# Patient Record
Sex: FEMALE | Race: ASIAN | NOT HISPANIC OR LATINO | Employment: UNEMPLOYED | URBAN - METROPOLITAN AREA
[De-identification: names, ages, dates, MRNs, and addresses within clinical notes are randomized per-mention and may not be internally consistent; named-entity substitution may affect disease eponyms.]

---

## 2022-04-25 ENCOUNTER — OFFICE VISIT (OUTPATIENT)
Dept: PRIMARY CARE | Facility: CLINIC | Age: 42
End: 2022-04-25
Payer: COMMERCIAL

## 2022-04-25 VITALS
WEIGHT: 122 LBS | TEMPERATURE: 97.5 F | HEIGHT: 64 IN | OXYGEN SATURATION: 97 % | HEART RATE: 83 BPM | SYSTOLIC BLOOD PRESSURE: 112 MMHG | DIASTOLIC BLOOD PRESSURE: 70 MMHG | BODY MASS INDEX: 20.83 KG/M2

## 2022-04-25 DIAGNOSIS — Z00.00 ENCOUNTER FOR PREVENTATIVE ADULT HEALTH CARE EXAMINATION: ICD-10-CM

## 2022-04-25 DIAGNOSIS — E78.00 HYPERCHOLESTEROLEMIA: ICD-10-CM

## 2022-04-25 DIAGNOSIS — G44.89 OTHER HEADACHE SYNDROME: ICD-10-CM

## 2022-04-25 DIAGNOSIS — B00.1 COLD SORE: ICD-10-CM

## 2022-04-25 DIAGNOSIS — Z00.00 PREVENTATIVE HEALTH CARE: Primary | ICD-10-CM

## 2022-04-25 DIAGNOSIS — R92.1 BREAST CALCIFICATIONS: ICD-10-CM

## 2022-04-25 DIAGNOSIS — Z12.31 ENCOUNTER FOR SCREENING MAMMOGRAM FOR MALIGNANT NEOPLASM OF BREAST: ICD-10-CM

## 2022-04-25 PROCEDURE — 99386 PREV VISIT NEW AGE 40-64: CPT | Performed by: INTERNAL MEDICINE

## 2022-04-25 PROCEDURE — 3008F BODY MASS INDEX DOCD: CPT | Performed by: INTERNAL MEDICINE

## 2022-04-25 RX ORDER — VALACYCLOVIR HYDROCHLORIDE 1 G/1
TABLET, FILM COATED ORAL
COMMUNITY
Start: 2022-04-20 | End: 2023-02-14 | Stop reason: SDUPTHER

## 2022-04-25 ASSESSMENT — ENCOUNTER SYMPTOMS
BACK PAIN: 0
BLOOD IN STOOL: 0
LIGHT-HEADEDNESS: 0
HEMATURIA: 0
COUGH: 0
FATIGUE: 0
FEVER: 0
WHEEZING: 0
ARTHRALGIAS: 0
SLEEP DISTURBANCE: 0
MYALGIAS: 0
DYSPHORIC MOOD: 0
APPETITE CHANGE: 0
SINUS PRESSURE: 0
HEADACHES: 1
ACTIVITY CHANGE: 0
DIZZINESS: 0
PALPITATIONS: 0
NERVOUS/ANXIOUS: 0

## 2022-04-25 ASSESSMENT — PATIENT HEALTH QUESTIONNAIRE - PHQ9: SUM OF ALL RESPONSES TO PHQ9 QUESTIONS 1 & 2: 0

## 2022-04-25 NOTE — ASSESSMENT & PLAN NOTE
Noted on mammogr in [place].  Had biopsy in July 2021.  Plan mammogram -- ordered.  Advised pt to request mammogram records from [facility] for radiologist here to be able to compare.

## 2022-04-25 NOTE — PROGRESS NOTES
[facility] [place]  Dr. Vibha Feliciano  [address], [address]  [place] [address]  Phone: [phone]  Fax: [phone]        Patient ID: Tyree Rob                              : 1980    Visit Date: 2022    Chief Complaint: Establish Care      Patient ID: Tyree Rob is a 42 y.o. female.    HPI  New to area--    Moved from [place] -  from this area.    Hx high chol.  Trying to manage with diet.    Screening:  Exercise: 2-3 days/week     Diet: trying to eat healthy     Mood: No concerns     Occupation: home     Living Situation: lives with family     Dental: no concerns     Eye Check: No concern     Smoking: none    Alcohol: rare    Mammogram due in July.  Cervical Cancer Screening due -- will set up with our office    Vaccinations:  HIV Screening due on   Influenza Vaccine(1) due   DTaP, Tdap, and Td Vaccines(2 - Td) UTD      Hx breast calcifications-- had biopsy last year.  Was negative.        Patient Active Problem List   Diagnosis   • Breast calcifications   • Hypercholesterolemia   • Other headache syndrome   • Cold sore   • Encounter for preventative adult health care examination       Past Medical History:   Diagnosis Date   • H/O cold sores        Past Surgical History:   Procedure Laterality Date   •  SECTION      x2   • CORRECTION HAMMER TOE Right    • GANGLION CYST EXCISION Left          Current Outpatient Medications:   •  valACYclovir (VALTREX) 1 gram tablet, TAKE 2 TABLETS BY MOUTH EVERY 12 HOURS FOR 1 DAY, Disp: , Rfl:     No Known Allergies    Family History   Problem Relation Age of Onset   • Hyperlipidemia Biological Mother    • Osteoporosis Biological Mother    • Hyperlipidemia Biological Father    • Hypertension Biological Father        Social History     Tobacco Use   • Smoking status: Never Smoker   • Smokeless tobacco: Never Used   Substance Use Topics   • Alcohol use: Never   • Drug use: Never       Health  "Maintenance   Topic Date Due   • Cervical Cancer Screening  04/25/2022 (Originally 1/22/1999)   • HIV Screening  04/25/2023 (Originally 1/22/1991)   • Hepatitis C Screening  04/25/2023 (Originally 1/22/1996)   • Influenza Vaccine (Season Ended) 08/01/2022   • Breast Cancer Screening  07/01/2023   • DTaP, Tdap, and Td Vaccines (2 - Td or Tdap) 04/15/2024   • Zoster Vaccine (1 of 2) 01/22/2028   • COVID-19 Vaccine  Completed   • Meningococcal ACWY  Aged Out   • HIB Vaccines  Aged Out   • IPV Vaccines  Aged Out   • HPV Vaccines  Aged Out   • Pneumococcal  Aged Out         The following have been reviewed and updated as appropriate in this visit:   Tobacco  Allergies  Meds  Problems  Med Hx  Surg Hx  Fam Hx           Review of System  Review of Systems   Constitutional: Negative for activity change, appetite change, fatigue and fever.   HENT: Negative for dental problem, hearing loss, sinus pressure and sneezing.    Eyes: Negative for visual disturbance.   Respiratory: Negative for cough and wheezing.    Cardiovascular: Negative for chest pain, palpitations and leg swelling.   Gastrointestinal: Negative for blood in stool.   Genitourinary: Negative for hematuria.   Musculoskeletal: Negative for arthralgias, back pain and myalgias.   Neurological: Positive for headaches. Negative for dizziness and light-headedness.   Psychiatric/Behavioral: Negative for dysphoric mood and sleep disturbance. The patient is not nervous/anxious.        Objective     Vitals  Vitals:    04/25/22 1525   BP: 112/70   BP Location: Left upper arm   Patient Position: Sitting   Pulse: 83   Temp: 36.4 °C (97.5 °F)   TempSrc: Temporal   SpO2: 97%   Weight: 55.3 kg (122 lb)   Height: 1.626 m (5' 4\")       Body mass index is 20.94 kg/m².    Physical Exam  Physical Exam  Constitutional:       General: She is not in acute distress.     Appearance: Normal appearance. She is well-developed. She is not diaphoretic.   HENT:      Head: Normocephalic " and atraumatic.      Right Ear: Tympanic membrane, ear canal and external ear normal. There is no impacted cerumen.      Left Ear: Tympanic membrane, ear canal and external ear normal. There is no impacted cerumen.      Nose: Nose normal. No congestion.      Mouth/Throat:      Mouth: Mucous membranes are moist.      Pharynx: Oropharynx is clear. No oropharyngeal exudate.   Eyes:      General: No scleral icterus.        Right eye: No discharge.         Left eye: No discharge.      Conjunctiva/sclera: Conjunctivae normal.      Pupils: Pupils are equal, round, and reactive to light.   Neck:      Thyroid: No thyromegaly.      Vascular: No JVD.      Trachea: No tracheal deviation.   Cardiovascular:      Rate and Rhythm: Normal rate and regular rhythm.      Heart sounds: Normal heart sounds. No murmur heard.    No friction rub. No gallop.   Pulmonary:      Effort: Pulmonary effort is normal. No respiratory distress.      Breath sounds: Normal breath sounds. No wheezing or rales.   Chest:      Chest wall: No tenderness.   Abdominal:      General: There is no distension.      Palpations: Abdomen is soft. There is no mass.      Tenderness: There is no abdominal tenderness. There is no guarding or rebound.      Hernia: No hernia is present.   Musculoskeletal:         General: No tenderness or deformity. Normal range of motion.      Cervical back: Normal range of motion and neck supple.      Right lower leg: No edema.      Left lower leg: No edema.   Lymphadenopathy:      Cervical: No cervical adenopathy.   Skin:     General: Skin is warm and dry.      Coloration: Skin is not pale.      Findings: No erythema or rash.   Neurological:      Mental Status: She is alert and oriented to person, place, and time.      Cranial Nerves: No cranial nerve deficit.      Sensory: No sensory deficit.      Motor: No abnormal muscle tone.      Coordination: Coordination normal.      Deep Tendon Reflexes: Reflexes normal.   Psychiatric:          Behavior: Behavior normal.         Thought Content: Thought content normal.         Judgment: Judgment normal.         Assessment/Plan     Problem List Items Addressed This Visit        Unprioritized    Breast calcifications     Noted on mammogr in [place].  Had biopsy in July 2021.  Plan mammogram -- ordered.  Advised pt to request mammogram records from [facility] for radiologist here to be able to compare.            Cold sore     On PRN Valtrex.           Relevant Medications    valACYclovir (VALTREX) 1 gram tablet    Encounter for preventative adult health care examination     Reviewed vaccinations, diet, exercise, work setting; patient Medical, Surgical, FH, SH, allergies.   FH - both parents with DM2, high chol.   Ordered pertinent labs.  Discussed continue to exercise regularly and practice healthy dietary choices.              Hypercholesterolemia     Borderline.  Diet controlled.           Other headache syndrome     Pre menstural.  Stable.              Other Visit Diagnoses     Preventative health care    -  Primary    Relevant Orders    Comprehensive metabolic panel    CBC and differential    Lipid panel    Urinalysis with Reflex Culture    TSH w reflex FT4    Encounter for screening mammogram for malignant neoplasm of breast        Relevant Orders    BI SCREENING MAMMOGRAM BILATERAL(TOMOSYNTHESIS)          Return in about 1 year (around 4/25/2023).      Vibha Feliciano MD  4/25/2022

## 2022-04-25 NOTE — ASSESSMENT & PLAN NOTE
Reviewed vaccinations, diet, exercise, work setting; patient Medical, Surgical, FH, SH, allergies.   FH - both parents with DM2, high chol.   Ordered pertinent labs.  Discussed continue to exercise regularly and practice healthy dietary choices.

## 2022-04-28 LAB
ALBUMIN SERPL-MCNC: 4.6 G/DL (ref 3.6–5.1)
ALBUMIN/GLOB SERPL: 1.8 (CALC) (ref 1–2.5)
ALP SERPL-CCNC: 40 U/L (ref 31–125)
ALT SERPL-CCNC: 8 U/L (ref 6–29)
APPEARANCE UR: CLEAR
AST SERPL-CCNC: 15 U/L (ref 10–30)
BACTERIA #/AREA URNS HPF: ABNORMAL /HPF
BACTERIA UR CULT: NORMAL
BASOPHILS # BLD AUTO: 70 CELLS/UL (ref 0–200)
BASOPHILS NFR BLD AUTO: 1.8 %
BILIRUB SERPL-MCNC: 0.5 MG/DL (ref 0.2–1.2)
BILIRUB UR QL STRIP: NEGATIVE
BUN SERPL-MCNC: 15 MG/DL (ref 7–25)
BUN/CREAT SERPL: NORMAL (CALC) (ref 6–22)
CALCIUM SERPL-MCNC: 9.2 MG/DL (ref 8.6–10.2)
CHLORIDE SERPL-SCNC: 108 MMOL/L (ref 98–110)
CHOLEST SERPL-MCNC: 185 MG/DL
CHOLEST/HDLC SERPL: 3.4 (CALC)
CO2 SERPL-SCNC: 26 MMOL/L (ref 20–32)
COLOR UR: YELLOW
CREAT SERPL-MCNC: 0.7 MG/DL (ref 0.5–1.1)
EOSINOPHIL # BLD AUTO: 308 CELLS/UL (ref 15–500)
EOSINOPHIL NFR BLD AUTO: 7.9 %
ERYTHROCYTE [DISTWIDTH] IN BLOOD BY AUTOMATED COUNT: 12.7 % (ref 11–15)
GLOBULIN SER CALC-MCNC: 2.6 G/DL (CALC) (ref 1.9–3.7)
GLUCOSE SERPL-MCNC: 86 MG/DL (ref 65–99)
GLUCOSE UR QL STRIP: NEGATIVE
HCT VFR BLD AUTO: 37.1 % (ref 35–45)
HDLC SERPL-MCNC: 54 MG/DL
HGB BLD-MCNC: 12.5 G/DL (ref 11.7–15.5)
HGB UR QL STRIP: ABNORMAL
HYALINE CASTS #/AREA URNS LPF: ABNORMAL /LPF
KETONES UR QL STRIP: NEGATIVE
LDLC SERPL CALC-MCNC: 116 MG/DL (CALC)
LEUKOCYTE ESTERASE UR QL STRIP: NEGATIVE
LYMPHOCYTES # BLD AUTO: 1162 CELLS/UL (ref 850–3900)
LYMPHOCYTES NFR BLD AUTO: 29.8 %
MCH RBC QN AUTO: 31.5 PG (ref 27–33)
MCHC RBC AUTO-ENTMCNC: 33.7 G/DL (ref 32–36)
MCV RBC AUTO: 93.5 FL (ref 80–100)
MONOCYTES # BLD AUTO: 296 CELLS/UL (ref 200–950)
MONOCYTES NFR BLD AUTO: 7.6 %
NEUTROPHILS # BLD AUTO: 2063 CELLS/UL (ref 1500–7800)
NEUTROPHILS NFR BLD AUTO: 52.9 %
NITRITE UR QL STRIP: NEGATIVE
NONHDLC SERPL-MCNC: 131 MG/DL (CALC)
PH UR STRIP: 7 [PH] (ref 5–8)
PLATELET # BLD AUTO: 119 THOUSAND/UL (ref 140–400)
PMV BLD REES-ECKER: 12.6 FL (ref 7.5–12.5)
POTASSIUM SERPL-SCNC: 4.1 MMOL/L (ref 3.5–5.3)
PROT SERPL-MCNC: 7.2 G/DL (ref 6.1–8.1)
PROT UR QL STRIP: NEGATIVE
QUEST EGFR AFRICAN AMERICAN: 122 ML/MIN/1.73M2
QUEST EGFR NON-AFR. AMERICAN: 105 ML/MIN/1.73M2
RBC # BLD AUTO: 3.97 MILLION/UL (ref 3.8–5.1)
RBC #/AREA URNS HPF: ABNORMAL /HPF
SODIUM SERPL-SCNC: 141 MMOL/L (ref 135–146)
SP GR UR STRIP: 1.02 (ref 1–1.03)
SQUAMOUS #/AREA URNS HPF: ABNORMAL /HPF
TRIGL SERPL-MCNC: 60 MG/DL
TSH SERPL-ACNC: 1.98 MIU/L
WBC # BLD AUTO: 3.9 THOUSAND/UL (ref 3.8–10.8)
WBC #/AREA URNS HPF: ABNORMAL /HPF

## 2022-04-29 DIAGNOSIS — D69.6 THROMBOCYTOPENIA (CMS/HCC): Primary | ICD-10-CM

## 2022-05-12 ENCOUNTER — OFFICE VISIT (OUTPATIENT)
Dept: PRIMARY CARE | Facility: CLINIC | Age: 42
End: 2022-05-12
Payer: COMMERCIAL

## 2022-05-12 VITALS
RESPIRATION RATE: 18 BRPM | TEMPERATURE: 98.4 F | DIASTOLIC BLOOD PRESSURE: 68 MMHG | SYSTOLIC BLOOD PRESSURE: 98 MMHG | BODY MASS INDEX: 20.96 KG/M2 | OXYGEN SATURATION: 99 % | HEIGHT: 64 IN | WEIGHT: 122.8 LBS | HEART RATE: 74 BPM

## 2022-05-12 DIAGNOSIS — Z01.419 ENCOUNTER FOR GYNECOLOGICAL EXAMINATION WITHOUT ABNORMAL FINDING: Primary | ICD-10-CM

## 2022-05-12 PROCEDURE — 99396 PREV VISIT EST AGE 40-64: CPT | Performed by: NURSE PRACTITIONER

## 2022-05-12 PROCEDURE — 3008F BODY MASS INDEX DOCD: CPT | Performed by: NURSE PRACTITIONER

## 2022-05-12 ASSESSMENT — PATIENT HEALTH QUESTIONNAIRE - PHQ9: SUM OF ALL RESPONSES TO PHQ9 QUESTIONS 1 & 2: 0

## 2022-05-12 ASSESSMENT — ENCOUNTER SYMPTOMS: CONSTITUTIONAL NEGATIVE: 1

## 2022-05-12 NOTE — PROGRESS NOTES
[facility] [place]  Jennifer TranovikipHEVER  [address]  [facility] [address]  [phone]  Fax [phone]      2022    Tyree Rob is a 42 y.o. female who presents for annual exam.   Periods are regular every 28-30 days, lasting 4 days. Dysmenorrhea:mild, occurring premenstrually. Cyclic symptoms include headache. No intermenstrual bleeding, spotting, or discharge.    The patient is sexually active. GYN screening history: last pap: was normal. Domestic violence: no.     Current contraception: none  History of abnormal Pap smear: no  Family history of uterine or ovarian cancer: no  Regular self breast exam: yes  History of abnormal mammogram: yes - calcification in left breast- did bx   Family history of breast cancer: no  History of abnormal lipids: no    Menstrual History:  OB History        3    Para   3    Term                AB        Living           SAB        IAB        Ectopic        Multiple        Live Births                    Patient's last menstrual period was 2022 (approximate).         Past Medical History:   Diagnosis Date   • H/O cold sores        Past Surgical History:   Procedure Laterality Date   •  SECTION      x2   • CORRECTION HAMMER TOE Right    • GANGLION CYST EXCISION Left        Family History   Problem Relation Age of Onset   • Hyperlipidemia Biological Mother    • Osteoporosis Biological Mother    • Hyperlipidemia Biological Father    • Hypertension Biological Father        Social History     Tobacco Use   • Smoking status: Never Smoker   • Smokeless tobacco: Never Used   Substance Use Topics   • Alcohol use: Never   • Drug use: Never       The following have been reviewed and updated as appropriate in this visit:  Allergies  Meds  Problems           HPI  Routine GYN  No hormones  Denies breast, pelvic or vaginal symptoms.      Review Of Systems  Review of Systems   Constitutional: Negative.   "  Genitourinary: Negative.    Breast: Negative.       Visit Vitals  BP 98/68 (BP Location: Left upper arm, Patient Position: Sitting)   Pulse 74   Temp 36.9 °C (98.4 °F) (Oral)   Resp 18   Ht 1.626 m (5' 4.02\")   Wt 55.7 kg (122 lb 12.8 oz)   LMP 04/28/2022 (Approximate)   SpO2 99%   BMI 21.07 kg/m²       OB/GYN Physical Exam  Physical Exam  Constitutional:       General: She is not in acute distress.     Appearance: Normal appearance. She is not diaphoretic.   Genitourinary:      Vagina, cervix, uterus and urethral meatus normal.     External female genitalia normal.   Urethral meatus normal.   Vagina normal.   Cervix exam normal.  Uterus is normal.   Adnexa normal. Neck:      Thyroid: No thyromegaly.   Cardiovascular:      Rate and Rhythm: Normal rate and regular rhythm.      Heart sounds: No murmur heard.    No friction rub. No gallop.   Pulmonary:      Effort: Pulmonary effort is normal.      Breath sounds: Normal breath sounds. No wheezing, rhonchi or rales.   Chest:   Breasts:      Right: Normal. No axillary adenopathy.      Left: Normal. No axillary adenopathy.       Abdominal:      General: Bowel sounds are normal. There is no distension.      Palpations: Abdomen is soft. There is no mass.      Tenderness: There is no abdominal tenderness. There is no right CVA tenderness or left CVA tenderness.   Musculoskeletal:      Cervical back: Neck supple. No rigidity or tenderness.      Right lower leg: No edema.      Left lower leg: No edema.   Lymphadenopathy:      Cervical: No cervical adenopathy.      Upper Body:      Right upper body: No axillary adenopathy.      Left upper body: No axillary adenopathy.   Neurological:      Mental Status: She is alert and oriented to person, place, and time.   Vitals reviewed.           Problem List Items Addressed This Visit     Encounter for gynecological examination without abnormal finding - Primary    Current Assessment & Plan     PAP sent  Get mammogram done( ordered by " Jodie)  Self breast exams monthly recommended.           Relevant Orders    Cytology, Thinprep Pap              HEVER Kerr  5/12/2022

## 2022-05-16 LAB
CLINICAL INFO: NORMAL
CYTO CVX: NORMAL
CYTOLOGIST CVX/VAG CYTO: NORMAL
CYTOLOGY CMNT CVX/VAG CYTO-IMP: NORMAL
DATE OF PREVIOUS PAP: NORMAL
DATE PREVIOUS BX: NORMAL
HPV E6+E7 MRNA CVX QL NAA+PROBE: NOT DETECTED
LMP START DATE: NORMAL
QUEST COMMENT (PAP): NORMAL
SPECIMEN SOURCE CVX/VAG CYTO: NORMAL
STAT OF ADQ CVX/VAG CYTO-IMP: NORMAL

## 2022-09-30 ENCOUNTER — HOSPITAL ENCOUNTER (OUTPATIENT)
Dept: RADIOLOGY | Age: 42
Discharge: HOME | End: 2022-09-30
Attending: FAMILY MEDICINE
Payer: COMMERCIAL

## 2022-09-30 ENCOUNTER — OFFICE VISIT (OUTPATIENT)
Dept: PRIMARY CARE | Facility: CLINIC | Age: 42
End: 2022-09-30
Payer: COMMERCIAL

## 2022-09-30 VITALS
BODY MASS INDEX: 20.83 KG/M2 | SYSTOLIC BLOOD PRESSURE: 119 MMHG | HEIGHT: 64 IN | OXYGEN SATURATION: 98 % | DIASTOLIC BLOOD PRESSURE: 70 MMHG | RESPIRATION RATE: 18 BRPM | HEART RATE: 77 BPM | TEMPERATURE: 97 F | WEIGHT: 122 LBS

## 2022-09-30 DIAGNOSIS — S09.90XA RECENT HEAD TRAUMA, INITIAL ENCOUNTER: Primary | ICD-10-CM

## 2022-09-30 DIAGNOSIS — S06.0X0A CONCUSSION WITHOUT LOSS OF CONSCIOUSNESS, INITIAL ENCOUNTER: ICD-10-CM

## 2022-09-30 DIAGNOSIS — S09.90XA RECENT HEAD TRAUMA, INITIAL ENCOUNTER: ICD-10-CM

## 2022-09-30 PROCEDURE — 99213 OFFICE O/P EST LOW 20 MIN: CPT | Performed by: FAMILY MEDICINE

## 2022-09-30 PROCEDURE — 3008F BODY MASS INDEX DOCD: CPT | Performed by: FAMILY MEDICINE

## 2022-09-30 PROCEDURE — G1004 CDSM NDSC: HCPCS

## 2022-09-30 ASSESSMENT — ENCOUNTER SYMPTOMS
ALLERGIC/IMMUNOLOGIC NEGATIVE: 1
HEADACHES: 1
CARDIOVASCULAR NEGATIVE: 1
ENDOCRINE NEGATIVE: 1
CONSTITUTIONAL NEGATIVE: 1
PHOTOPHOBIA: 0
MUSCULOSKELETAL NEGATIVE: 1
HEMATOLOGIC/LYMPHATIC NEGATIVE: 1
PSYCHIATRIC NEGATIVE: 1
EYES NEGATIVE: 1
GASTROINTESTINAL NEGATIVE: 1
RESPIRATORY NEGATIVE: 1

## 2022-09-30 ASSESSMENT — PAIN SCALES - GENERAL: PAINLEVEL: 5

## 2022-09-30 NOTE — PROGRESS NOTES
"Subjective      Patient ID: Enkhsarnai Darron is a 42 y.o. female     HPI:  Evaluation of head trauma that occurred about 2 days ago.  She slipped and fell backwards and hit the left occipital area of her head on a step. Had swelling and has had a headache. She has been taking excedrin which helps.    The following have been reviewed and updated as appropriate in this visit:   Tobacco  Allergies  Meds  Problems  Med Hx  Surg Hx  Fam Hx         Review of Systems   Constitutional: Negative.    HENT: Negative.    Eyes: Negative.  Negative for photophobia and visual disturbance.   Respiratory: Negative.    Cardiovascular: Negative.    Gastrointestinal: Negative.    Endocrine: Negative.    Genitourinary: Negative.    Musculoskeletal: Negative.    Skin: Negative.    Allergic/Immunologic: Negative.    Neurological: Positive for headaches.   Hematological: Negative.    Psychiatric/Behavioral: Negative.    All other systems reviewed and are negative.    Vitals:    09/30/22 1316   BP: 119/70   BP Location: Left upper arm   Patient Position: Sitting   Pulse: 77   Resp: 18   Temp: 36.1 °C (97 °F)   SpO2: 98%   Weight: 55.3 kg (122 lb)   Height: 1.626 m (5' 4.02\")      Current Outpatient Medications   Medication Sig Dispense Refill    valACYclovir (VALTREX) 1 gram tablet TAKE 2 TABLETS BY MOUTH EVERY 12 HOURS FOR 1 DAY       No current facility-administered medications for this visit.      Social History     Tobacco Use    Smoking status: Never Smoker    Smokeless tobacco: Never Used   Substance Use Topics    Alcohol use: Never    Drug use: Never      Family History   Problem Relation Age of Onset    Hyperlipidemia Biological Mother     Osteoporosis Biological Mother     Hyperlipidemia Biological Father     Hypertension Biological Father         Past Medical History:   Diagnosis Date    H/O cold sores         Objective     Physical Exam  Vitals reviewed.   Constitutional:       Appearance: Normal appearance. She " is normal weight.   Cardiovascular:      Rate and Rhythm: Normal rate and regular rhythm.      Heart sounds: Normal heart sounds.   Pulmonary:      Effort: Pulmonary effort is normal.      Breath sounds: Normal breath sounds.   Skin:     General: Skin is warm.   Neurological:      General: No focal deficit present.      Mental Status: She is alert and oriented to person, place, and time.      Motor: No weakness.      Coordination: Coordination normal.   Psychiatric:         Mood and Affect: Mood normal.         Problem List Items Addressed This Visit    None     Visit Diagnoses     Recent head trauma, initial encounter    -  Primary    Relevant Orders    CT HEAD WITHOUT IV CONTRAST    Concussion without loss of consciousness, initial encounter              Assessment/Plan     Head trauma: See HPI for details.  This occurred 2 days ago and she hit the occipital area area of her head and since that time has had headaches although they seem to be responding to Excedrin.  On physical exam there are no focal deficits noted.  She probably sustained a concussion with this injury and we did discuss this.  I will check a CT of the head without IV contrast to assess further.

## 2022-10-25 ENCOUNTER — HOSPITAL ENCOUNTER (OUTPATIENT)
Dept: RADIOLOGY | Age: 42
Discharge: HOME | End: 2022-10-25
Attending: INTERNAL MEDICINE
Payer: COMMERCIAL

## 2022-10-25 DIAGNOSIS — Z12.31 ENCOUNTER FOR SCREENING MAMMOGRAM FOR MALIGNANT NEOPLASM OF BREAST: ICD-10-CM

## 2022-10-25 PROCEDURE — 77067 SCR MAMMO BI INCL CAD: CPT

## 2022-10-28 ENCOUNTER — TELEPHONE (OUTPATIENT)
Dept: PRIMARY CARE | Facility: CLINIC | Age: 42
End: 2022-10-28
Payer: COMMERCIAL

## 2022-10-28 NOTE — TELEPHONE ENCOUNTER
----- Message from Jerry Montano, DO sent at 10/25/2022  4:22 PM EDT -----  Negative mammogram. Patient has extremely dense breast tissue. This can obscure smaller areas of interest. I do recommend that we have a follow-up ultrasound in this case. Please let me know and I can put the order in. You can also let her know that in the future, if she is interested, we can send both at once (mammogram and ultrasound), so she does not have to make 2 trips.

## 2023-02-14 NOTE — TELEPHONE ENCOUNTER
Medicine Refill Request    Last Office: 9/30/2022   Last Consult Visit: Visit date not found  Last Telemedicine Visit: Visit date not found    Next Appointment: Visit date not found      Current Outpatient Medications:   •  valACYclovir (VALTREX) 1 gram tablet, TAKE 2 TABLETS BY MOUTH EVERY 12 HOURS FOR 1 DAY, Disp: , Rfl:       BP Readings from Last 3 Encounters:   09/30/22 119/70   05/12/22 98/68   04/25/22 112/70       Recent Lab results:  Lab Results   Component Value Date    CHOL 185 04/28/2022   ,   Lab Results   Component Value Date    HDL 54 04/28/2022   ,   Lab Results   Component Value Date    LDLCALC 116 (H) 04/28/2022   ,   Lab Results   Component Value Date    TRIG 60 04/28/2022        Lab Results   Component Value Date    GLUCOSE 86 04/28/2022    GLUCOSE NEGATIVE 04/28/2022   , No results found for: HGBA1C      Lab Results   Component Value Date    CREATININE 0.70 04/28/2022       Lab Results   Component Value Date    TSH 1.98 04/28/2022

## 2023-02-15 DIAGNOSIS — R92.30 DENSE BREAST TISSUE ON MAMMOGRAM: Primary | ICD-10-CM

## 2023-02-15 RX ORDER — VALACYCLOVIR HYDROCHLORIDE 1 G/1
1000 TABLET, FILM COATED ORAL 2 TIMES DAILY
Qty: 14 TABLET | Refills: 0 | Status: SHIPPED | OUTPATIENT
Start: 2023-02-15 | End: 2023-06-03 | Stop reason: SDUPTHER

## 2023-02-24 ENCOUNTER — HOSPITAL ENCOUNTER (OUTPATIENT)
Dept: RADIOLOGY | Age: 43
Discharge: HOME | End: 2023-02-24
Attending: STUDENT IN AN ORGANIZED HEALTH CARE EDUCATION/TRAINING PROGRAM
Payer: COMMERCIAL

## 2023-02-24 DIAGNOSIS — R92.30 DENSE BREAST TISSUE ON MAMMOGRAM: ICD-10-CM

## 2023-02-24 PROCEDURE — 76641 ULTRASOUND BREAST COMPLETE: CPT | Mod: 50

## 2023-04-11 ENCOUNTER — HOSPITAL ENCOUNTER (EMERGENCY)
Facility: HOSPITAL | Age: 43
Discharge: HOME | End: 2023-04-11
Attending: EMERGENCY MEDICINE | Admitting: EMERGENCY MEDICINE
Payer: COMMERCIAL

## 2023-04-11 ENCOUNTER — APPOINTMENT (EMERGENCY)
Dept: RADIOLOGY | Facility: HOSPITAL | Age: 43
End: 2023-04-11
Payer: COMMERCIAL

## 2023-04-11 VITALS
OXYGEN SATURATION: 99 % | TEMPERATURE: 98 F | HEIGHT: 64 IN | WEIGHT: 120 LBS | BODY MASS INDEX: 20.49 KG/M2 | HEART RATE: 80 BPM | DIASTOLIC BLOOD PRESSURE: 63 MMHG | RESPIRATION RATE: 16 BRPM | SYSTOLIC BLOOD PRESSURE: 129 MMHG

## 2023-04-11 DIAGNOSIS — M25.512 ACUTE PAIN OF LEFT SHOULDER: Primary | ICD-10-CM

## 2023-04-11 PROCEDURE — 73030 X-RAY EXAM OF SHOULDER: CPT | Mod: LT

## 2023-04-11 PROCEDURE — 99283 EMERGENCY DEPT VISIT LOW MDM: CPT

## 2023-04-11 RX ORDER — CYCLOBENZAPRINE HCL 10 MG
10 TABLET ORAL 2 TIMES DAILY PRN
Qty: 10 TABLET | Refills: 0 | Status: SHIPPED | OUTPATIENT
Start: 2023-04-11 | End: 2025-01-30 | Stop reason: ALTCHOICE

## 2023-04-11 RX ORDER — LIDOCAINE 560 MG/1
1 PATCH PERCUTANEOUS; TOPICAL; TRANSDERMAL DAILY
Qty: 30 PATCH | Refills: 0 | Status: SHIPPED | OUTPATIENT
Start: 2023-04-11 | End: 2024-10-24 | Stop reason: ALTCHOICE

## 2023-04-11 ASSESSMENT — ENCOUNTER SYMPTOMS
FEVER: 0
NUMBNESS: 0
ABDOMINAL PAIN: 0
BACK PAIN: 0
SHORTNESS OF BREATH: 0
COUGH: 0
CHILLS: 0
NECK PAIN: 0
WEAKNESS: 0
VOMITING: 0
AGITATION: 0
WOUND: 0
NAUSEA: 0
COLOR CHANGE: 0

## 2023-04-12 ENCOUNTER — TELEPHONE (OUTPATIENT)
Dept: PRIMARY CARE | Facility: CLINIC | Age: 43
End: 2023-04-12
Payer: COMMERCIAL

## 2023-04-12 NOTE — ED ATTESTATION NOTE
The patient was evaluated and managed by the physician assistant / nurse practitioner.       Jesus Baer,   04/11/23 2028
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
## 2023-04-12 NOTE — TELEPHONE ENCOUNTER
Spoke with patient and ED follow up call performed. Patient doing somewhat okay and fully understands discharge instructions.    No additional needs identified at this time.  Patient will scheduled follow up visit with PCP if needed.    Reviewed PCP contact information, office hours, after-hours access and discussed use of Urgent Care.    No additional follow up needed at this time.

## 2023-06-05 RX ORDER — VALACYCLOVIR HYDROCHLORIDE 1 G/1
1000 TABLET, FILM COATED ORAL 2 TIMES DAILY
Qty: 14 TABLET | Refills: 0 | Status: SHIPPED | OUTPATIENT
Start: 2023-06-05 | End: 2023-10-06 | Stop reason: ALTCHOICE

## 2023-06-05 NOTE — TELEPHONE ENCOUNTER
Medicine Refill Request    Last Office: 9/30/2022   Last Consult Visit: Visit date not found  Last Telemedicine Visit: Visit date not found    Next Appointment: Visit date not found      Current Outpatient Medications:   •  cyclobenzaprine (FLEXERIL) 10 mg tablet, Take 1 tablet (10 mg total) by mouth 2 (two) times a day as needed for muscle spasms., Disp: 10 tablet, Rfl: 0  •  lidocaine (ASPERCREME) 4 % adhesive patch,medicated topical patch, Apply 1 patch topically daily. Apply 1 patch to area of pain for up to 12 hours, once every 24 hour period., Disp: 30 patch, Rfl: 0  •  valACYclovir (VALTREX) 1 gram tablet, Take 1 tablet (1,000 mg total) by mouth 2 (two) times a day for 7 days., Disp: 14 tablet, Rfl: 0      BP Readings from Last 3 Encounters:   04/11/23 129/63   09/30/22 119/70   05/12/22 98/68       Recent Lab results:  Lab Results   Component Value Date    CHOL 185 04/28/2022   ,   Lab Results   Component Value Date    HDL 54 04/28/2022   ,   Lab Results   Component Value Date    LDLCALC 116 (H) 04/28/2022   ,   Lab Results   Component Value Date    TRIG 60 04/28/2022        Lab Results   Component Value Date    GLUCOSE 86 04/28/2022    GLUCOSE NEGATIVE 04/28/2022   , No results found for: HGBA1C      Lab Results   Component Value Date    CREATININE 0.70 04/28/2022       Lab Results   Component Value Date    TSH 1.98 04/28/2022

## 2023-09-22 ENCOUNTER — APPOINTMENT (OUTPATIENT)
Dept: URBAN - METROPOLITAN AREA CLINIC 203 | Age: 43
Setting detail: DERMATOLOGY
End: 2023-09-26

## 2023-09-22 DIAGNOSIS — L30.9 DERMATITIS, UNSPECIFIED: ICD-10-CM

## 2023-09-22 DIAGNOSIS — L08.9 LOCAL INFECTION OF THE SKIN AND SUBCUTANEOUS TISSUE, UNSPECIFIED: ICD-10-CM

## 2023-09-22 PROCEDURE — OTHER COUNSELING: OTHER

## 2023-09-22 PROCEDURE — 99203 OFFICE O/P NEW LOW 30 MIN: CPT

## 2023-09-22 PROCEDURE — OTHER PRESCRIPTION: OTHER

## 2023-09-22 RX ORDER — CEPHALEXIN 250 MG/1
CAPSULE ORAL
Qty: 14 | Refills: 0 | Status: ERX | COMMUNITY
Start: 2023-09-22

## 2023-09-22 RX ORDER — CLOBETASOL PROPIONATE 0.5 MG/G
CREAM TOPICAL
Qty: 45 | Refills: 0 | Status: ERX | COMMUNITY
Start: 2023-09-22

## 2023-09-26 ENCOUNTER — OFFICE VISIT (OUTPATIENT)
Dept: PRIMARY CARE | Facility: CLINIC | Age: 43
End: 2023-09-26
Payer: COMMERCIAL

## 2023-09-26 VITALS
OXYGEN SATURATION: 99 % | HEART RATE: 89 BPM | RESPIRATION RATE: 18 BRPM | TEMPERATURE: 97.6 F | DIASTOLIC BLOOD PRESSURE: 62 MMHG | BODY MASS INDEX: 21.13 KG/M2 | HEIGHT: 64 IN | WEIGHT: 123.8 LBS | SYSTOLIC BLOOD PRESSURE: 98 MMHG

## 2023-09-26 DIAGNOSIS — L25.5 CONTACT DERMATITIS DUE TO PLANT: Primary | ICD-10-CM

## 2023-09-26 PROCEDURE — 99213 OFFICE O/P EST LOW 20 MIN: CPT | Performed by: NURSE PRACTITIONER

## 2023-09-26 PROCEDURE — 3008F BODY MASS INDEX DOCD: CPT | Performed by: NURSE PRACTITIONER

## 2023-09-26 RX ORDER — CLOBETASOL PROPIONATE 0.5 MG/G
CREAM TOPICAL
COMMUNITY
Start: 2023-09-22 | End: 2024-10-24 | Stop reason: ALTCHOICE

## 2023-09-26 RX ORDER — PREDNISONE 10 MG/1
TABLET ORAL
Qty: 21 TABLET | Refills: 0 | Status: SHIPPED | OUTPATIENT
Start: 2023-09-26 | End: 2023-10-06 | Stop reason: ALTCHOICE

## 2023-09-26 RX ORDER — CEPHALEXIN 250 MG/1
CAPSULE ORAL
COMMUNITY
Start: 2023-09-22 | End: 2023-10-06 | Stop reason: ALTCHOICE

## 2023-09-26 ASSESSMENT — ENCOUNTER SYMPTOMS
SHORTNESS OF BREATH: 0
FATIGUE: 0
FEVER: 0

## 2023-09-26 ASSESSMENT — PAIN SCALES - GENERAL: PAINLEVEL: 0-NO PAIN

## 2023-09-26 ASSESSMENT — PATIENT HEALTH QUESTIONNAIRE - PHQ9: SUM OF ALL RESPONSES TO PHQ9 QUESTIONS 1 & 2: 0

## 2023-09-26 NOTE — PROGRESS NOTES
[facility] at [address]  [place] [address]  [phone]  Fax [phone]      Patient ID: Tyree Rob                              : 1980    Visit Date: 2023    Chief Complaint: Rash (On legs and arms/Saw dermatologist a week ago /Given a cream, but not relief/Has new spots that are coming up )         Patient ID: Tyree Rob is a 43 y.o. female.    Patient Active Problem List   Diagnosis    Breast calcifications    Hypercholesterolemia    Other headache syndrome    Cold sore    Encounter for gynecological examination without abnormal finding    Contact dermatitis due to plant         Current Outpatient Medications:     cephalexin (KEFLEX) 250 mg capsule, TAKE 1 CAPSULE BY MOUTH TWICE DAILY FOR 7 DAYS, Disp: , Rfl:     clobetasoL (TEMOVATE) 0.05 % cream, APPLY A THIN LAYER TO LESIONS ON LEG TWICE A DAY FOR 2 WEEKS, Disp: , Rfl:     cyclobenzaprine (FLEXERIL) 10 mg tablet, Take 1 tablet (10 mg total) by mouth 2 (two) times a day as needed for muscle spasms., Disp: 10 tablet, Rfl: 0    lidocaine (ASPERCREME) 4 % adhesive patch,medicated topical patch, Apply 1 patch topically daily. Apply 1 patch to area of pain for up to 12 hours, once every 24 hour period., Disp: 30 patch, Rfl: 0    predniSONE (DELTASONE) 10 mg tablet, 2 po BID x 3 then 1 po BID x 3 then 1 po QD x 3. Take with food., Disp: 21 tablet, Rfl: 0    valACYclovir (VALTREX) 1 gram tablet, Take 1 tablet (1,000 mg total) by mouth 2 (two) times a day for 7 days., Disp: 14 tablet, Rfl: 0    No Known Allergies    Social History     Tobacco Use    Smoking status: Never    Smokeless tobacco: Never   Substance Use Topics    Alcohol use: Never    Drug use: Never       Health Maintenance   Topic Date Due    Hepatitis B Vaccines (1 of 3 - 3-dose series) Never done    Colorectal Cancer Screening  Never done    Depression Screening  Never done    HIV  "Screening  Never done    Hepatitis C Screening  Never done    Influenza Vaccine (1) 08/01/2023    Breast Cancer Screening  10/25/2023    DTaP, Tdap, and Td Vaccines (2 - Td or Tdap) 04/15/2024    Cervical Cancer Screening  05/12/2027    Zoster Vaccine (1 of 2) 01/22/2028    COVID-19 Vaccine  Completed    Meningococcal ACWY  Aged Out    HIB Vaccines  Aged Out    IPV Vaccines  Aged Out    HPV Vaccines  Aged Out    Pneumococcal  Aged Out       HPI  Follow up  Persistent rash.  On Keflex and topical steroids.    Rash  This is a new problem. The current episode started 1 to 4 weeks ago. The problem is unchanged. The rash is diffuse. The rash is characterized by blistering, redness, swelling and itchiness. She was exposed to plant contact. Pertinent negatives include no facial edema, fatigue, fever or shortness of breath. Past treatments include antibiotics and topical steroids. The treatment provided significant relief.       The following have been reviewed and updated as appropriate in this visit:          Review of System  Review of Systems   Constitutional: Negative for fatigue and fever.   Respiratory: Negative for shortness of breath.    Skin: Positive for rash.       Objective     Vitals  Vitals:    09/26/23 0920   BP: 98/62   BP Location: Left upper arm   Patient Position: Sitting   Pulse: 89   Resp: 18   Temp: 36.4 °C (97.6 °F)   TempSrc: Temporal   SpO2: 99%   Weight: 56.2 kg (123 lb 12.8 oz)   Height: 1.626 m (5' 4.02\")     Body mass index is 21.24 kg/m².      Physical Exam  Vitals reviewed.   Constitutional:       General: She is not in acute distress.     Appearance: Normal appearance. She is not diaphoretic.   Cardiovascular:      Rate and Rhythm: Normal rate and regular rhythm.   Pulmonary:      Effort: Pulmonary effort is normal. No respiratory distress.   Skin:     Findings: Rash present.      Comments: Erythematous vesiculopapular rash on arms and legs.  No discharge  Non tender.  Linear " distribution.   Neurological:      Mental Status: She is alert and oriented to person, place, and time.         Assessment/Plan     Problem List Items Addressed This Visit     Contact dermatitis due to plant - Primary     Prednisone taper as directed with food.  Stop antibiotic  Short tepid showers  Loose clothing  Follow up if symptoms worsen/persist.           Relevant Medications    clobetasoL (TEMOVATE) 0.05 % cream    predniSONE (DELTASONE) 10 mg tablet           HEVER Kerr  9/26/2023

## 2023-09-26 NOTE — ASSESSMENT & PLAN NOTE
Prednisone taper as directed with food.  Stop antibiotic  Short tepid showers  Loose clothing  Follow up if symptoms worsen/persist.

## 2023-10-06 ENCOUNTER — TELEPHONE (OUTPATIENT)
Dept: PRIMARY CARE | Facility: CLINIC | Age: 43
End: 2023-10-06

## 2023-10-06 ENCOUNTER — OFFICE VISIT (OUTPATIENT)
Dept: PRIMARY CARE | Facility: CLINIC | Age: 43
End: 2023-10-06
Payer: COMMERCIAL

## 2023-10-06 VITALS
DIASTOLIC BLOOD PRESSURE: 80 MMHG | OXYGEN SATURATION: 99 % | SYSTOLIC BLOOD PRESSURE: 120 MMHG | HEART RATE: 75 BPM | TEMPERATURE: 97.2 F | WEIGHT: 124.4 LBS | RESPIRATION RATE: 16 BRPM | BODY MASS INDEX: 21.24 KG/M2 | HEIGHT: 64 IN

## 2023-10-06 DIAGNOSIS — L23.9 ALLERGIC CONTACT DERMATITIS, UNSPECIFIED TRIGGER: Primary | ICD-10-CM

## 2023-10-06 PROCEDURE — 99213 OFFICE O/P EST LOW 20 MIN: CPT | Performed by: NURSE PRACTITIONER

## 2023-10-06 PROCEDURE — 3008F BODY MASS INDEX DOCD: CPT | Performed by: NURSE PRACTITIONER

## 2023-10-06 RX ORDER — PREDNISONE 10 MG/1
TABLET ORAL
Qty: 21 TABLET | Refills: 0 | Status: SHIPPED | OUTPATIENT
Start: 2023-10-06 | End: 2024-10-15 | Stop reason: ALTCHOICE

## 2023-10-06 ASSESSMENT — ENCOUNTER SYMPTOMS
SHORTNESS OF BREATH: 0
FEVER: 0
FATIGUE: 0

## 2023-10-06 NOTE — ASSESSMENT & PLAN NOTE
Unsure if plant contact now.  Persistent on legs  Second round of steroids given today  Benadryl cream PRN  Avoid hot showers  Loose clothing  Will try to get a DERM appt for biopsy if persisting

## 2023-10-06 NOTE — PROGRESS NOTES
[facility] at [address]  [place] [address]  [phone]  Fax [phone]      Patient ID: Tyree Rob                              : 1980    Visit Date: 10/6/2023    Chief Complaint: Rash         Patient ID: Tyree Rob is a 43 y.o. female.    Patient Active Problem List   Diagnosis    Breast calcifications    Hypercholesterolemia    Other headache syndrome    Cold sore    Encounter for gynecological examination without abnormal finding    Allergic contact dermatitis         Current Outpatient Medications:     clobetasoL (TEMOVATE) 0.05 % cream, APPLY A THIN LAYER TO LESIONS ON LEG TWICE A DAY FOR 2 WEEKS, Disp: , Rfl:     cyclobenzaprine (FLEXERIL) 10 mg tablet, Take 1 tablet (10 mg total) by mouth 2 (two) times a day as needed for muscle spasms., Disp: 10 tablet, Rfl: 0    lidocaine (ASPERCREME) 4 % adhesive patch,medicated topical patch, Apply 1 patch topically daily. Apply 1 patch to area of pain for up to 12 hours, once every 24 hour period., Disp: 30 patch, Rfl: 0    predniSONE (DELTASONE) 10 mg tablet, 2 po BID x 3 then 1 po BID x 3 then 1 po QD x 3. Take with food., Disp: 21 tablet, Rfl: 0    No Known Allergies    Social History     Tobacco Use    Smoking status: Never    Smokeless tobacco: Never   Substance Use Topics    Alcohol use: Never    Drug use: Never       Health Maintenance   Topic Date Due    Hepatitis B Vaccines (1 of 3 - 3-dose series) Never done    Colorectal Cancer Screening  Never done    HIV Screening  Never done    Hepatitis C Screening  Never done    Influenza Vaccine (1) 2023    COVID-19 Vaccine (4 - - season) 2023    Breast Cancer Screening  10/25/2023    DTaP, Tdap, and Td Vaccines (2 - Td or Tdap) 04/15/2024    Depression Screening  2024    Cervical Cancer Screening  2027    Zoster Vaccine (1 of 2) 2028    Meningococcal ACWY  Aged Out  "   HIB Vaccines  Aged Out    IPV Vaccines  Aged Out    HPV Vaccines  Aged Out    Pneumococcal  Aged Out       HPI  Persistent rash legs--no new lesions but not resolving  Completed steroids  Using topical steroids with no improvement.    Rash  This is a new problem. The current episode started 1 to 4 weeks ago. The problem has been gradually improving since onset. The affected locations include the left arm, left lower leg, right lower leg and right arm. The rash is characterized by redness, swelling and itchiness. It is unknown if there was an exposure to a precipitant. Pertinent negatives include no fatigue, fever, joint pain or shortness of breath. Past treatments include antibiotics, antihistamine, moisturizer, topical steroids and oral steroids. The treatment provided mild relief. There is no history of eczema.       The following have been reviewed and updated as appropriate in this visit:          Review of System  Review of Systems   Constitutional: Negative for fatigue and fever.   Respiratory: Negative for shortness of breath.    Musculoskeletal: Negative for joint pain.   Skin: Positive for rash.       Objective     Vitals  Vitals:    10/06/23 1345   BP: 120/80   BP Location: Left upper arm   Patient Position: Sitting   Pulse: 75   Resp: 16   Temp: 36.2 °C (97.2 °F)   TempSrc: Temporal   SpO2: 99%   Weight: 56.4 kg (124 lb 6.4 oz)   Height: 1.626 m (5' 4.02\")     Body mass index is 21.34 kg/m².      Physical Exam  Vitals reviewed.   Constitutional:       General: She is not in acute distress.     Appearance: Normal appearance. She is not diaphoretic.   Cardiovascular:      Rate and Rhythm: Normal rate and regular rhythm.   Pulmonary:      Effort: Pulmonary effort is normal. No respiratory distress.   Skin:     Findings: Rash present.      Comments: Clusters of papular erythematous lesions along with some singular lesions scattered on both lower legs  Fading lesions on arms.  No exudate.  Non tender   "   Neurological:      Mental Status: She is alert and oriented to person, place, and time.         Assessment/Plan     Problem List Items Addressed This Visit     Allergic contact dermatitis - Primary     Unsure if plant contact now.  Persistent on legs  Second round of steroids given today  Benadryl cream PRN  Avoid hot showers  Loose clothing  Will try to get a DERM appt for biopsy if persisting         Relevant Medications    predniSONE (DELTASONE) 10 mg tablet           HEVER Kerr  10/6/2023

## 2023-10-06 NOTE — TELEPHONE ENCOUNTER
Scheduled appt with Dr Michelle Godwin at [facility] for Tuesday Oct 31st at 1PM. Pt notified and all information for Dr Godwin sent to pt via My Chart portal

## 2024-10-15 ENCOUNTER — HOSPITAL ENCOUNTER (OUTPATIENT)
Dept: RADIOLOGY | Age: 44
Discharge: HOME | End: 2024-10-15
Attending: NURSE PRACTITIONER
Payer: COMMERCIAL

## 2024-10-15 ENCOUNTER — OFFICE VISIT (OUTPATIENT)
Dept: PRIMARY CARE | Facility: CLINIC | Age: 44
End: 2024-10-15
Payer: COMMERCIAL

## 2024-10-15 VITALS
BODY MASS INDEX: 20.86 KG/M2 | HEART RATE: 66 BPM | DIASTOLIC BLOOD PRESSURE: 72 MMHG | SYSTOLIC BLOOD PRESSURE: 98 MMHG | TEMPERATURE: 98.4 F | WEIGHT: 122.2 LBS | HEIGHT: 64 IN | OXYGEN SATURATION: 99 % | RESPIRATION RATE: 18 BRPM

## 2024-10-15 DIAGNOSIS — M25.562 ACUTE PAIN OF LEFT KNEE: Primary | ICD-10-CM

## 2024-10-15 DIAGNOSIS — M25.562 ACUTE PAIN OF LEFT KNEE: ICD-10-CM

## 2024-10-15 PROBLEM — L23.9 ALLERGIC CONTACT DERMATITIS: Status: RESOLVED | Noted: 2023-09-26 | Resolved: 2024-10-15

## 2024-10-15 PROCEDURE — 99213 OFFICE O/P EST LOW 20 MIN: CPT | Performed by: NURSE PRACTITIONER

## 2024-10-15 PROCEDURE — 73564 X-RAY EXAM KNEE 4 OR MORE: CPT | Mod: 50

## 2024-10-15 PROCEDURE — 3008F BODY MASS INDEX DOCD: CPT | Performed by: NURSE PRACTITIONER

## 2024-10-15 RX ORDER — MELOXICAM 15 MG/1
15 TABLET ORAL DAILY
Qty: 14 TABLET | Refills: 0 | Status: SHIPPED | OUTPATIENT
Start: 2024-10-15 | End: 2025-01-30 | Stop reason: ALTCHOICE

## 2024-10-15 ASSESSMENT — ENCOUNTER SYMPTOMS
NUMBNESS: 0
WEAKNESS: 0
LOSS OF SENSATION: 0
MUSCLE WEAKNESS: 0
JOINT SWELLING: 1
ARTHRALGIAS: 1
LOSS OF MOTION: 0
TINGLING: 0
INABILITY TO BEAR WEIGHT: 0
CONSTITUTIONAL NEGATIVE: 1

## 2024-10-15 ASSESSMENT — PAIN SCALES - GENERAL: PAINLEVEL_OUTOF10: 0-NO PAIN

## 2024-10-15 ASSESSMENT — PATIENT HEALTH QUESTIONNAIRE - PHQ9: SUM OF ALL RESPONSES TO PHQ9 QUESTIONS 1 & 2: 0

## 2024-10-15 NOTE — ASSESSMENT & PLAN NOTE
X-ray knee  Meloxicam QD with food  Ice after exercise  Low impact exercise for now  Follow up if symptoms worsen/persist.

## 2024-10-15 NOTE — PROGRESS NOTES
[facility] at [place]  [address]  [place] [address]  [phone]  Fax [phone]      Patient ID: Tyree Rob                              : 1980    Visit Date: 10/15/2024    Chief Complaint: Knee Pain (Hit it on something /started hurting /Was exercising and aggravated it more /)         Patient ID: Tyree Rob is a 44 y.o. female.    Patient Active Problem List   Diagnosis    Breast calcifications    Hypercholesterolemia    Other headache syndrome    Cold sore    Encounter for gynecological examination without abnormal finding    Acute pain of left knee         Current Outpatient Medications:     clobetasoL (TEMOVATE) 0.05 % cream, APPLY A THIN LAYER TO LESIONS ON LEG TWICE A DAY FOR 2 WEEKS, Disp: , Rfl:     cyclobenzaprine (FLEXERIL) 10 mg tablet, Take 1 tablet (10 mg total) by mouth 2 (two) times a day as needed for muscle spasms., Disp: 10 tablet, Rfl: 0    lidocaine (ASPERCREME) 4 % adhesive patch,medicated topical patch, Apply 1 patch topically daily. Apply 1 patch to area of pain for up to 12 hours, once every 24 hour period., Disp: 30 patch, Rfl: 0    meloxicam (MOBIC) 15 mg tablet, Take 1 tablet (15 mg total) by mouth daily for 14 days., Disp: 14 tablet, Rfl: 0    No Known Allergies    Social History     Tobacco Use    Smoking status: Never    Smokeless tobacco: Never   Substance Use Topics    Alcohol use: Never    Drug use: Never       Health Maintenance   Topic Date Due    Colorectal Cancer Screening  Never done    HIV Screening  Never done    Hepatitis C Screening  Never done    Hepatitis B Vaccines (1 of 3 - 19+ 3-dose series) Never done    Breast Cancer Screening  10/25/2023    DTaP, Tdap, and Td Vaccines (2 - Td or Tdap) 04/15/2024    Influenza Vaccine (1) 2024    COVID-19 Vaccine (5 -  season) 2024    Depression Screening  10/15/2025    Cervical Cancer Screening  2027    Zoster Vaccine (1 of  "2) 01/22/2028    RSV Vaccine (1 - 1-dose 75+ series) 01/22/2053    Meningococcal ACWY  Aged Out    RSV <20 months  Aged Out    HIB Vaccines  Aged Out    IPV Vaccines  Aged Out    HPV Vaccines  Aged Out    Pneumococcal  Aged Out       HPI  Knee pain L  Injured weeks ago  Bumped on counter top  Now it has been hurting with exercise  Medial aspect mainly    Knee Pain   The incident occurred more than 1 week ago. The incident occurred at home. The injury mechanism was a direct blow. The pain is present in the left knee. The quality of the pain is described as aching. The pain is moderate. The pain has been Fluctuating since onset. Pertinent negatives include no inability to bear weight, loss of motion, loss of sensation, muscle weakness, numbness or tingling. She reports no foreign bodies present. The symptoms are aggravated by movement and weight bearing. She has tried nothing for the symptoms.       The following have been reviewed and updated as appropriate in this visit:          Review of System  Review of Systems   Constitutional: Negative.    Musculoskeletal:  Positive for arthralgias and joint swelling.   Neurological:  Negative for tingling, weakness and numbness.       Objective     Vitals  Vitals:    10/15/24 1259   BP: 98/72   BP Location: Left upper arm   Patient Position: Sitting   Pulse: 66   Resp: 18   Temp: 36.9 °C (98.4 °F)   TempSrc: Temporal   SpO2: 99%   Weight: 55.4 kg (122 lb 3.2 oz)   Height: 1.626 m (5' 4\")     Body mass index is 20.98 kg/m².    Physical Exam  Physical Exam  Vitals reviewed.   Constitutional:       General: She is not in acute distress.     Appearance: Normal appearance. She is not ill-appearing, toxic-appearing or diaphoretic.   Cardiovascular:      Rate and Rhythm: Normal rate and regular rhythm.   Pulmonary:      Effort: Pulmonary effort is normal. No respiratory distress.   Musculoskeletal:      Left knee: Swelling present. No deformity, effusion, erythema or ecchymosis. " Normal range of motion. No tenderness.      Instability Tests: Anterior drawer test negative. Medial Ramandeep test negative and lateral Ramandeep test negative.   Neurological:      Mental Status: She is alert and oriented to person, place, and time.         Assessment/Plan     Problem List Items Addressed This Visit       Acute pain of left knee - Primary     X-ray knee  Meloxicam QD with food  Ice after exercise  Low impact exercise for now  Follow up if symptoms worsen/persist.           Relevant Medications    meloxicam (MOBIC) 15 mg tablet    Other Relevant Orders    X-RAY KNEE BILATERAL 4+ VIEWS           HEVER Kerr  10/15/2024

## 2024-10-24 ENCOUNTER — OFFICE VISIT (OUTPATIENT)
Dept: PRIMARY CARE | Facility: CLINIC | Age: 44
End: 2024-10-24
Payer: COMMERCIAL

## 2024-10-24 VITALS
OXYGEN SATURATION: 97 % | BODY MASS INDEX: 20.83 KG/M2 | WEIGHT: 122 LBS | TEMPERATURE: 97.8 F | HEIGHT: 64 IN | RESPIRATION RATE: 17 BRPM

## 2024-10-24 DIAGNOSIS — L08.9 BACTERIAL SKIN INFECTION: Primary | ICD-10-CM

## 2024-10-24 DIAGNOSIS — B96.89 BACTERIAL SKIN INFECTION: Primary | ICD-10-CM

## 2024-10-24 PROCEDURE — 99213 OFFICE O/P EST LOW 20 MIN: CPT | Performed by: NURSE PRACTITIONER

## 2024-10-24 PROCEDURE — 3008F BODY MASS INDEX DOCD: CPT | Performed by: NURSE PRACTITIONER

## 2024-10-24 RX ORDER — CEPHALEXIN 500 MG/1
500 CAPSULE ORAL 3 TIMES DAILY
Qty: 15 CAPSULE | Refills: 0 | Status: SHIPPED | OUTPATIENT
Start: 2024-10-24 | End: 2024-10-29

## 2024-10-24 ASSESSMENT — ENCOUNTER SYMPTOMS: FEVER: 0

## 2024-10-24 ASSESSMENT — PAIN SCALES - GENERAL: PAINLEVEL_OUTOF10: 0-NO PAIN

## 2024-10-24 NOTE — ASSESSMENT & PLAN NOTE
Cephalexin TID x 5 days  Keep skin clean and dry  Topical Cortisone 10 cream BID  Follow up if symptoms worsen/persist.

## 2024-10-24 NOTE — PROGRESS NOTES
[facility] at [address]  [place] [address]  [phone]  Fax [phone]      Patient ID: Tyree Rob                              : 1980    Visit Date: 10/24/2024    Chief Complaint: Rash (Had microneedling treatment done few weeks ago. Tried hydrocortisone cream )         Patient ID: Tyree Rob is a 44 y.o. female.    Patient Active Problem List   Diagnosis    Breast calcifications    Hypercholesterolemia    Other headache syndrome    Cold sore    Encounter for gynecological examination without abnormal finding    Acute pain of left knee    Bacterial skin infection         Current Outpatient Medications:     cephalexin (KEFLEX) 500 mg capsule, Take 1 capsule (500 mg total) by mouth 3 (three) times a day for 5 days., Disp: 15 capsule, Rfl: 0    cyclobenzaprine (FLEXERIL) 10 mg tablet, Take 1 tablet (10 mg total) by mouth 2 (two) times a day as needed for muscle spasms., Disp: 10 tablet, Rfl: 0    meloxicam (MOBIC) 15 mg tablet, Take 1 tablet (15 mg total) by mouth daily for 14 days., Disp: 14 tablet, Rfl: 0    No Known Allergies    Social History     Tobacco Use    Smoking status: Never    Smokeless tobacco: Never   Substance Use Topics    Alcohol use: Never    Drug use: Never       Health Maintenance   Topic Date Due    Colorectal Cancer Screening  Never done    HIV Screening  Never done    Hepatitis C Screening  Never done    Hepatitis B Vaccines (1 of 3 - 19+ 3-dose series) Never done    Breast Cancer Screening  10/25/2023    DTaP, Tdap, and Td Vaccines (2 - Td or Tdap) 04/15/2024    Influenza Vaccine (1) 2024    COVID-19 Vaccine (5 - - season) 2024    Depression Screening  10/15/2025    Cervical Cancer Screening  2027    Zoster Vaccine (1 of 2) 2028    RSV Vaccine (1 - 1-dose 75+ series) 2053    Meningococcal ACWY  Aged Out    RSV <20 months  Aged Out    HIB Vaccines  Aged Out    IPV  "Vaccines  Aged Out    HPV Vaccines  Aged Out    Pneumococcal  Aged Out       HPI  Rash developed after microneedling of a scar on her L upper arm last week    Rash  This is a new problem. The current episode started in the past 7 days. The problem is unchanged. The affected locations include the left arm. The rash is characterized by pain, redness, swelling and itchiness. Associated with: microneedling. Pertinent negatives include no fever or joint pain. Past treatments include topical steroids, moisturizer, anti-itch cream and antibiotic cream. The treatment provided no relief.       The following have been reviewed and updated as appropriate in this visit:   Allergies  Meds  Problems         Review of System  Review of Systems   Constitutional:  Negative for fever.   Musculoskeletal:  Negative for joint pain.   Skin:  Positive for rash.       Objective     Vitals  Vitals:    10/24/24 1007   Resp: 17   Temp: 36.6 °C (97.8 °F)   TempSrc: Temporal   SpO2: 97%   Weight: 55.3 kg (122 lb)   Height: 1.626 m (5' 4\")     Body mass index is 20.94 kg/m².    Physical Exam  Physical Exam  Vitals reviewed.   Constitutional:       General: She is not in acute distress.     Appearance: Normal appearance. She is not ill-appearing, toxic-appearing or diaphoretic.   Cardiovascular:      Rate and Rhythm: Normal rate and regular rhythm.   Pulmonary:      Effort: Pulmonary effort is normal. No respiratory distress.   Skin:     Findings: Rash present.      Comments: Erythematous dry papular rash on L upper arm @ old scar where microneedling procedure was performed last week.  Mild tenderness  No discharge/exudate     Neurological:      Mental Status: She is alert and oriented to person, place, and time.         Assessment/Plan     Problem List Items Addressed This Visit       Bacterial skin infection - Primary     Cephalexin TID x 5 days  Keep skin clean and dry  Topical Cortisone 10 cream BID  Follow up if symptoms " worsen/persist.           Relevant Medications    cephalexin (KEFLEX) 500 mg capsule           HEVER Kerr  10/24/2024

## 2025-01-07 ENCOUNTER — TELEPHONE (OUTPATIENT)
Dept: PRIMARY CARE | Facility: CLINIC | Age: 45
End: 2025-01-07
Payer: COMMERCIAL

## 2025-01-29 PROBLEM — B96.89 BACTERIAL SKIN INFECTION: Status: RESOLVED | Noted: 2024-10-24 | Resolved: 2025-01-29

## 2025-01-29 PROBLEM — Z00.00 ROUTINE PHYSICAL EXAMINATION: Status: ACTIVE | Noted: 2025-01-29

## 2025-01-29 PROBLEM — L08.9 BACTERIAL SKIN INFECTION: Status: RESOLVED | Noted: 2024-10-24 | Resolved: 2025-01-29

## 2025-01-29 PROBLEM — M25.562 ACUTE PAIN OF LEFT KNEE: Status: RESOLVED | Noted: 2024-10-15 | Resolved: 2025-01-29

## 2025-01-30 ENCOUNTER — OFFICE VISIT (OUTPATIENT)
Dept: PRIMARY CARE | Facility: CLINIC | Age: 45
End: 2025-01-30
Payer: COMMERCIAL

## 2025-01-30 VITALS
HEIGHT: 64 IN | OXYGEN SATURATION: 98 % | TEMPERATURE: 98 F | RESPIRATION RATE: 16 BRPM | WEIGHT: 121 LBS | HEART RATE: 71 BPM | SYSTOLIC BLOOD PRESSURE: 98 MMHG | DIASTOLIC BLOOD PRESSURE: 64 MMHG | BODY MASS INDEX: 20.66 KG/M2

## 2025-01-30 DIAGNOSIS — Z00.00 ROUTINE PHYSICAL EXAMINATION: Primary | ICD-10-CM

## 2025-01-30 DIAGNOSIS — Z23 NEED FOR IMMUNIZATION AGAINST INFLUENZA: ICD-10-CM

## 2025-01-30 DIAGNOSIS — R92.1 BREAST CALCIFICATIONS: ICD-10-CM

## 2025-01-30 DIAGNOSIS — Z11.4 SCREENING FOR HIV (HUMAN IMMUNODEFICIENCY VIRUS): ICD-10-CM

## 2025-01-30 DIAGNOSIS — B00.1 COLD SORE: ICD-10-CM

## 2025-01-30 DIAGNOSIS — Z11.59 NEED FOR HEPATITIS C SCREENING TEST: ICD-10-CM

## 2025-01-30 DIAGNOSIS — Z12.11 COLON CANCER SCREENING: ICD-10-CM

## 2025-01-30 DIAGNOSIS — E78.00 HYPERCHOLESTEROLEMIA: ICD-10-CM

## 2025-01-30 PROCEDURE — 3008F BODY MASS INDEX DOCD: CPT | Performed by: NURSE PRACTITIONER

## 2025-01-30 PROCEDURE — 90471 IMMUNIZATION ADMIN: CPT | Performed by: NURSE PRACTITIONER

## 2025-01-30 PROCEDURE — 90656 IIV3 VACC NO PRSV 0.5 ML IM: CPT | Performed by: NURSE PRACTITIONER

## 2025-01-30 PROCEDURE — 99396 PREV VISIT EST AGE 40-64: CPT | Mod: 25 | Performed by: NURSE PRACTITIONER

## 2025-01-30 RX ORDER — VALACYCLOVIR HYDROCHLORIDE 1 G/1
1000 TABLET, FILM COATED ORAL 2 TIMES DAILY PRN
Qty: 30 TABLET | Refills: 2 | Status: SHIPPED | OUTPATIENT
Start: 2025-01-30 | End: 2025-03-01

## 2025-01-30 ASSESSMENT — ENCOUNTER SYMPTOMS
NEUROLOGICAL NEGATIVE: 1
MUSCULOSKELETAL NEGATIVE: 1
ALLERGIC/IMMUNOLOGIC NEGATIVE: 1
CARDIOVASCULAR NEGATIVE: 1
EYES NEGATIVE: 1
GASTROINTESTINAL NEGATIVE: 1
CONSTITUTIONAL NEGATIVE: 1
HEMATOLOGIC/LYMPHATIC NEGATIVE: 1
ENDOCRINE NEGATIVE: 1
PSYCHIATRIC NEGATIVE: 1
RESPIRATORY NEGATIVE: 1

## 2025-01-30 ASSESSMENT — PAIN SCALES - GENERAL: PAINLEVEL_OUTOF10: 0-NO PAIN

## 2025-01-30 NOTE — PROGRESS NOTES
[facility] [place]  [address]  [facility] [address]  [phone]  Fax [phone]      Patient ID: Enkhsaniraji Darron                              : 1980    Visit Date: 2025    Chief Complaint: Annual Exam    Patient Active Problem List   Diagnosis    Breast calcifications    Hypercholesterolemia    Other headache syndrome    Cold sore    Encounter for gynecological examination without abnormal finding    Routine physical examination       Current Outpatient Medications   Medication Sig Dispense Refill    valACYclovir (VALTREX) 1 gram tablet Take 1 tablet (1,000 mg total) by mouth 2 (two) times a day as needed (cold sores). 30 tablet 2    betamethasone dipropionate 0.05 % cream Apply topically 2 (two) times a day for 10 days. 45 g 0     No current facility-administered medications for this visit.       HPI  Routine PE        No Known Allergies    Past Surgical History   Procedure Laterality Date     section      x2    Correction hammer toe Right     Ganglion cyst excision Left        Past Medical History:   Diagnosis Date    H/O cold sores        Health Maintenance   Topic Date Due    Colorectal Cancer Screening  Never done    HIV Screening  Never done    Hepatitis C Screening  Never done    Breast Cancer Screening  10/25/2023    DTaP, Tdap, and Td Vaccines (2 - Td or Tdap) 2026 (Originally 4/15/2024)    COVID-19 Vaccine ( season) 2026 (Originally 2024)    Depression Screening  10/15/2025    Cervical Cancer Screening  2027    Zoster Vaccine (1 of 2) 2028    RSV Vaccine (1 - 1-dose 75+ series) 2053    Influenza Vaccine  Completed    Meningococcal ACWY  Aged Out    RSV <20 months  Aged Out    HIB Vaccines  Aged Out    IPV Vaccines  Aged Out    Meningococcal B  Aged Out    HPV Vaccines  Aged Out    Pneumococcal  Aged Out    Hepatitis B Vaccines  Discontinued       Social History  "    Socioeconomic History    Marital status:      Spouse name: None    Number of children: None    Years of education: None    Highest education level: None   Tobacco Use    Smoking status: Never    Smokeless tobacco: Never   Substance and Sexual Activity    Alcohol use: Never    Drug use: Never    Sexual activity: Defer     Social Drivers of Health     Food Insecurity: No Food Insecurity (4/11/2023)    Hunger Vital Sign     Worried About Running Out of Food in the Last Year: Never true     Ran Out of Food in the Last Year: Never true       Family History   Problem Relation Name Age of Onset    Hyperlipidemia Biological Mother      Osteoporosis Biological Mother      Hyperlipidemia Biological Father      Hypertension Biological Father         Review Of Systems  Review of Systems   Constitutional: Negative.    HENT: Negative.     Eyes: Negative.    Respiratory: Negative.     Cardiovascular: Negative.    Gastrointestinal: Negative.    Endocrine: Negative.    Genitourinary: Negative.    Musculoskeletal: Negative.         Occasional knee pain L--exercise helps   Skin: Negative.         Hx cold sores   Allergic/Immunologic: Negative.    Neurological: Negative.    Hematological: Negative.    Psychiatric/Behavioral: Negative.          Vitals:    01/30/25 0849   BP: 98/64   BP Location: Left upper arm   Patient Position: Sitting   Pulse: 71   Resp: 16   Temp: 36.7 °C (98 °F)   TempSrc: Temporal   SpO2: 98%   Weight: 54.9 kg (121 lb)   Height: 1.626 m (5' 4\")       Body mass index is 20.77 kg/m².      Physical Exam  Vitals reviewed.   Constitutional:       General: She is not in acute distress.     Appearance: Normal appearance. She is not ill-appearing, toxic-appearing or diaphoretic.   HENT:      Right Ear: Tympanic membrane, ear canal and external ear normal.      Left Ear: Tympanic membrane, ear canal and external ear normal.      Nose: Nose normal.      Mouth/Throat:      Mouth: Mucous membranes are moist.      " Pharynx: Oropharynx is clear. No oropharyngeal exudate or posterior oropharyngeal erythema.   Eyes:      Extraocular Movements: Extraocular movements intact.      Conjunctiva/sclera: Conjunctivae normal.      Pupils: Pupils are equal, round, and reactive to light.   Cardiovascular:      Rate and Rhythm: Normal rate and regular rhythm.      Heart sounds: No murmur heard.     No friction rub. No gallop.   Pulmonary:      Effort: Pulmonary effort is normal.      Breath sounds: Normal breath sounds. No wheezing, rhonchi or rales.   Abdominal:      General: Bowel sounds are normal. There is no distension.      Palpations: Abdomen is soft. There is no mass.      Tenderness: There is no abdominal tenderness. There is no right CVA tenderness or left CVA tenderness.   Musculoskeletal:      Cervical back: Neck supple. No rigidity or tenderness.      Right lower leg: No edema.      Left lower leg: No edema.   Lymphadenopathy:      Cervical: No cervical adenopathy.   Skin:     General: Skin is warm and dry.      Coloration: Skin is not pale.      Findings: No erythema or rash.   Neurological:      General: No focal deficit present.      Mental Status: She is alert and oriented to person, place, and time.      Gait: Gait normal.      Deep Tendon Reflexes: Reflexes normal.   Psychiatric:         Mood and Affect: Mood and affect normal.         Speech: Speech normal.         Behavior: Behavior normal.         Assessment/Plan     Problem List Items Addressed This Visit       Breast calcifications    Current Assessment & Plan     Diag mammo plus US ordered per pt request.         Relevant Orders    BI DIAGNOSTIC MAMMOGRAM BILATERAL (TOMOSYNTHESIS)    ULTRASOUND BREAST COMPLETE BILATERAL    Hypercholesterolemia    Current Assessment & Plan     Check lipids         Cold sore    Current Assessment & Plan     Valtrex renewed. Uses PRN         Relevant Medications    valACYclovir (VALTREX) 1 gram tablet    Routine physical examination -  Primary    Current Assessment & Plan     Labs ordered  Mammogram ordered  Schedule routine GYN exam  FIT kit given  Flu shot given         Relevant Orders    CBC and Differential    Comprehensive metabolic panel    Lipid panel    TSH 3rd Generation    Urinalysis with Reflex Culture (ED and Outpatient only)     Other Visit Diagnoses       Need for immunization against influenza        Relevant Orders    Influenza vaccine trivalent preservative free 6 mons and older IM (FluLaval) (Completed)    Screening for HIV (human immunodeficiency virus)        Relevant Orders    HIV 1,2 AB P24 AG    Need for hepatitis C screening test        Relevant Orders    Hepatitis C antibody    Colon cancer screening        Relevant Orders    FIT Test                    HEVER Kerr  1/30/2025

## 2025-02-10 LAB
ALBUMIN SERPL-MCNC: 4.6 G/DL (ref 3.9–4.9)
ALP SERPL-CCNC: 48 IU/L (ref 44–121)
ALT SERPL-CCNC: 10 IU/L (ref 0–32)
AST SERPL-CCNC: 14 IU/L (ref 0–40)
BASOPHILS # BLD AUTO: 0.1 X10E3/UL (ref 0–0.2)
BASOPHILS NFR BLD AUTO: 2 %
BILIRUB SERPL-MCNC: 0.3 MG/DL (ref 0–1.2)
BUN SERPL-MCNC: 25 MG/DL (ref 6–24)
BUN/CREAT SERPL: 28 (ref 9–23)
CALCIUM SERPL-MCNC: 9.1 MG/DL (ref 8.7–10.2)
CHLORIDE SERPL-SCNC: 103 MMOL/L (ref 96–106)
CHOLEST SERPL-MCNC: 258 MG/DL (ref 100–199)
CO2 SERPL-SCNC: 21 MMOL/L (ref 20–29)
CREAT SERPL-MCNC: 0.9 MG/DL (ref 0.57–1)
EGFRCR SERPLBLD CKD-EPI 2021: 79 ML/MIN/1.73
EOSINOPHIL # BLD AUTO: 0.2 X10E3/UL (ref 0–0.4)
EOSINOPHIL NFR BLD AUTO: 7 %
ERYTHROCYTE [DISTWIDTH] IN BLOOD BY AUTOMATED COUNT: 13.1 % (ref 11.7–15.4)
GLOBULIN SER CALC-MCNC: 2.6 G/DL (ref 1.5–4.5)
GLUCOSE SERPL-MCNC: 97 MG/DL (ref 70–99)
HCT VFR BLD AUTO: 39.1 % (ref 34–46.6)
HDLC SERPL-MCNC: 73 MG/DL
HGB BLD-MCNC: 12.4 G/DL (ref 11.1–15.9)
IMM GRANULOCYTES # BLD AUTO: 0 X10E3/UL (ref 0–0.1)
IMM GRANULOCYTES NFR BLD AUTO: 0 %
LDLC SERPL CALC-MCNC: 175 MG/DL (ref 0–99)
LYMPHOCYTES # BLD AUTO: 1.4 X10E3/UL (ref 0.7–3.1)
LYMPHOCYTES NFR BLD AUTO: 39 %
MCH RBC QN AUTO: 29.5 PG (ref 26.6–33)
MCHC RBC AUTO-ENTMCNC: 31.7 G/DL (ref 31.5–35.7)
MCV RBC AUTO: 93 FL (ref 79–97)
MONOCYTES # BLD AUTO: 0.2 X10E3/UL (ref 0.1–0.9)
MONOCYTES NFR BLD AUTO: 6 %
NEUTROPHILS # BLD AUTO: 1.6 X10E3/UL (ref 1.4–7)
NEUTROPHILS NFR BLD AUTO: 46 %
PLATELET # BLD AUTO: 140 X10E3/UL (ref 150–450)
POTASSIUM SERPL-SCNC: 4.1 MMOL/L (ref 3.5–5.2)
PROT SERPL-MCNC: 7.2 G/DL (ref 6–8.5)
RBC # BLD AUTO: 4.2 X10E6/UL (ref 3.77–5.28)
SODIUM SERPL-SCNC: 139 MMOL/L (ref 134–144)
TRIGL SERPL-MCNC: 65 MG/DL (ref 0–149)
TSH SERPL DL<=0.005 MIU/L-ACNC: 2.07 UIU/ML (ref 0.45–4.5)
VLDLC SERPL CALC-MCNC: 10 MG/DL (ref 5–40)
WBC # BLD AUTO: 3.5 X10E3/UL (ref 3.4–10.8)

## 2025-02-11 LAB
APPEARANCE UR: CLEAR
BACTERIA #/AREA URNS HPF: ABNORMAL /[HPF]
BILIRUB UR QL STRIP: NEGATIVE
CASTS URNS QL MICRO: ABNORMAL /LPF
COLOR UR: YELLOW
EPI CELLS #/AREA URNS HPF: ABNORMAL /HPF (ref 0–10)
GLUCOSE UR QL STRIP: NEGATIVE
HCV IGG SERPL QL IA: NON REACTIVE
HGB UR QL STRIP: ABNORMAL
HIV 1+2 AB+HIV1 P24 AG SERPL QL IA: NON REACTIVE
KETONES UR QL STRIP: NEGATIVE
LAB CORP URINALYSIS REFLEX: ABNORMAL
LEUKOCYTE ESTERASE UR QL STRIP: NEGATIVE
MICRO URNS: ABNORMAL
NITRITE UR QL STRIP: NEGATIVE
PH UR STRIP: 5.5 [PH] (ref 5–7.5)
PROT UR QL STRIP: ABNORMAL
RBC #/AREA URNS HPF: ABNORMAL /HPF (ref 0–2)
SP GR UR STRIP: 1.02 (ref 1–1.03)
UROBILINOGEN UR STRIP-MCNC: 0.2 MG/DL (ref 0.2–1)
WBC #/AREA URNS HPF: ABNORMAL /HPF (ref 0–5)

## 2025-02-13 ENCOUNTER — HOSPITAL ENCOUNTER (OUTPATIENT)
Dept: RADIOLOGY | Age: 45
Discharge: HOME | End: 2025-02-13
Attending: NURSE PRACTITIONER
Payer: COMMERCIAL

## 2025-02-13 DIAGNOSIS — R92.30 DENSE BREASTS: Primary | ICD-10-CM

## 2025-02-13 DIAGNOSIS — R92.1 BREAST CALCIFICATIONS: ICD-10-CM

## 2025-02-13 PROCEDURE — 77066 DX MAMMO INCL CAD BI: CPT

## 2025-02-20 ENCOUNTER — OFFICE VISIT (OUTPATIENT)
Dept: PRIMARY CARE | Facility: CLINIC | Age: 45
End: 2025-02-20
Payer: COMMERCIAL

## 2025-02-20 VITALS
OXYGEN SATURATION: 98 % | HEART RATE: 62 BPM | DIASTOLIC BLOOD PRESSURE: 62 MMHG | BODY MASS INDEX: 20.66 KG/M2 | WEIGHT: 121 LBS | TEMPERATURE: 97.7 F | SYSTOLIC BLOOD PRESSURE: 92 MMHG | HEIGHT: 64 IN | RESPIRATION RATE: 17 BRPM

## 2025-02-20 DIAGNOSIS — E78.00 HYPERCHOLESTEROLEMIA: ICD-10-CM

## 2025-02-20 DIAGNOSIS — R10.10 UPPER ABDOMINAL PAIN: Primary | ICD-10-CM

## 2025-02-20 PROCEDURE — 99213 OFFICE O/P EST LOW 20 MIN: CPT | Performed by: NURSE PRACTITIONER

## 2025-02-20 PROCEDURE — 3008F BODY MASS INDEX DOCD: CPT | Performed by: NURSE PRACTITIONER

## 2025-02-20 RX ORDER — FAMOTIDINE 20 MG/1
20 TABLET, FILM COATED ORAL 2 TIMES DAILY
Qty: 60 TABLET | Refills: 0 | Status: SHIPPED | OUTPATIENT
Start: 2025-02-20 | End: 2025-03-20 | Stop reason: SDUPTHER

## 2025-02-20 ASSESSMENT — ENCOUNTER SYMPTOMS
SHORTNESS OF BREATH: 0
DYSURIA: 0
HEMATURIA: 0
NAUSEA: 1
ABDOMINAL PAIN: 1
DIARRHEA: 0
VOMITING: 0
ANOREXIA: 0
FEVER: 0
FREQUENCY: 0
CONSTIPATION: 1

## 2025-02-20 ASSESSMENT — PAIN SCALES - GENERAL: PAINLEVEL_OUTOF10: 0-NO PAIN

## 2025-02-20 NOTE — PROGRESS NOTES
[facility] [address]  [place] [address]  [phone]  Fax [phone]      Patient ID: Tyree Rob                              : 1980    Visit Date: 2025    Chief Complaint: GI Problem (Upper abdominal pain , some nausea associated with )         Patient ID: Tyree Rob is a 45 y.o. female.    Patient Active Problem List   Diagnosis    Breast calcifications    Hypercholesterolemia    Other headache syndrome    Cold sore    Encounter for gynecological examination without abnormal finding    Routine physical examination    Upper abdominal pain         Current Outpatient Medications:     famotidine (PEPCID) 20 mg tablet, Take 1 tablet (20 mg total) by mouth 2 (two) times a day., Disp: 60 tablet, Rfl: 0    betamethasone dipropionate 0.05 % cream, Apply topically 2 (two) times a day for 10 days., Disp: 45 g, Rfl: 0    valACYclovir (VALTREX) 1 gram tablet, Take 1 tablet (1,000 mg total) by mouth 2 (two) times a day as needed (cold sores)., Disp: 30 tablet, Rfl: 2    No Known Allergies    Social History     Tobacco Use    Smoking status: Never    Smokeless tobacco: Never   Substance Use Topics    Alcohol use: Never    Drug use: Never       Health Maintenance   Topic Date Due    Colorectal Cancer Screening  Never done    DTaP, Tdap, and Td Vaccines (2 - Td or Tdap) 2026 (Originally 4/15/2024)    COVID-19 Vaccine ( season) 2026 (Originally 2024)    Depression Screening  10/15/2025    Breast Cancer Screening  2026    Cervical Cancer Screening  2027    Zoster Vaccine (1 of 2) 2028    RSV Vaccine (1 - 1-dose 75+ series) 2053    Influenza Vaccine  Completed    HIV Screening  Completed    Hepatitis C Screening  Completed    Meningococcal ACWY  Aged Out    RSV <20 months  Aged Out    HIB Vaccines  Aged Out    IPV Vaccines  Aged Out    Meningococcal B  Aged Out    HPV Vaccines  Aged  "Out    Pneumococcal  Aged Out    Hepatitis B Vaccines  Discontinued       HPI  Upper abdominal pain for the past week  Has been eating a high protein diet    Discuss recent lab results    Abdominal Pain  This is a new problem. The current episode started in the past 7 days. The problem occurs constantly. The problem has been unchanged. The pain is located in the epigastric region. The pain is moderate. The quality of the pain is aching and dull. Associated symptoms include constipation and nausea. Pertinent negatives include no anorexia, diarrhea, dysuria, fever, frequency, hematuria, melena or vomiting. The pain is aggravated by eating. She has tried nothing for the symptoms.   Hyperlipidemia  This is a new problem. This is a new diagnosis. The problem is uncontrolled. Recent lipid tests were reviewed and are high. She has no history of chronic renal disease, diabetes, hypothyroidism, liver disease, obesity or nephrotic syndrome. There are no known factors aggravating her hyperlipidemia. Pertinent negatives include no chest pain or shortness of breath. She is currently on no antihyperlipidemic treatment.       The following have been reviewed and updated as appropriate in this visit:          Review of System  Review of Systems   Constitutional:  Negative for fever.   Respiratory:  Negative for shortness of breath.    Cardiovascular:  Negative for chest pain.   Gastrointestinal:  Positive for abdominal pain, constipation and nausea. Negative for anorexia, diarrhea, melena and vomiting.   Genitourinary:  Negative for dysuria, frequency and hematuria.       Objective     Vitals  Vitals:    02/20/25 1419   BP: 92/62   BP Location: Right upper arm   Patient Position: Sitting   Pulse: 62   Resp: 17   Temp: 36.5 °C (97.7 °F)   TempSrc: Temporal   SpO2: 98%   Weight: 54.9 kg (121 lb)   Height: 1.626 m (5' 4\")     Body mass index is 20.77 kg/m².      Physical Exam  Vitals reviewed.   Constitutional:       General: She is " not in acute distress.     Appearance: Normal appearance. She is not ill-appearing or toxic-appearing.   Cardiovascular:      Rate and Rhythm: Normal rate and regular rhythm.   Pulmonary:      Effort: Pulmonary effort is normal. No respiratory distress.   Abdominal:      General: Bowel sounds are normal. There is no distension.      Palpations: Abdomen is soft. There is no mass.      Tenderness: There is no abdominal tenderness. There is no right CVA tenderness, left CVA tenderness, guarding or rebound.   Neurological:      Mental Status: She is alert and oriented to person, place, and time.         Assessment/Plan     Problem List Items Addressed This Visit       Hypercholesterolemia      Latest Reference Range & Units 02/10/25 09:16   Cholesterol 100 - 199 mg/dL 258 (H)   Triglycerides 0 - 149 mg/dL 65   HDL >39 mg/dL 73   LDL Calculated 0 - 99 mg/dL 175 (H)   (H): Data is abnormally high    Much higher than last reading  ? Lab error  Cut out daily eggs  Eat more fish, olive oil, oatmeal, walnuts/almonds, avocado  Repeat lipid panel 3 months         Relevant Orders    Lipid panel    Upper abdominal pain - Primary      Latest Reference Range & Units 02/10/25 09:16   Sodium 134 - 144 mmol/L 139   Potassium, Bld 3.5 - 5.2 mmol/L 4.1   Chloride 96 - 106 mmol/L 103   CO2 20 - 29 mmol/L 21   BUN 6 - 24 mg/dL 25 (H)   Creatinine 0.57 - 1.00 mg/dL 0.90   Glucose 70 - 99 mg/dL 97   Calcium 8.7 - 10.2 mg/dL 9.1   AST (SGOT) 0 - 40 IU/L 14   ALT (SGPT) 0 - 32 IU/L 10   Alkaline Phosphatase 44 - 121 IU/L 48   Total Protein 6.0 - 8.5 g/dL 7.2   Albumin 3.9 - 4.9 g/dL 4.6   Bilirubin, Total 0.0 - 1.2 mg/dL 0.3   eGFR >59 mL/min/1.73 79   Bun/Creatinine Ratio 9 - 23  28 (H)   Globulin 1.5 - 4.5 g/dL 2.6   (H): Data is abnormally high   Latest Reference Range & Units 02/10/25 09:16   WBC 3.4 - 10.8 x10E3/uL 3.5   RBC 3.77 - 5.28 x10E6/uL 4.20   Hemoglobin 11.1 - 15.9 g/dL 12.4   Hematocrit 34.0 - 46.6 % 39.1   MCV 79 - 97 fL 93    MCH 26.6 - 33.0 pg 29.5   MCHC 31.5 - 35.7 g/dL 31.7   RDW 11.7 - 15.4 % 13.1   Platelets 150 - 450 x10E3/uL 140 (L)   (L): Data is abnormally low   Latest Reference Range & Units 02/10/25 09:16   Color, Urine Yellow  Yellow   Clarity, Urine Clear  Clear   Specific Gravity, Urine 1.005 - 1.030  1.024   pH, Urine 5.0 - 7.5  5.5   Leukocyte Esterase Negative  Negative   Nitrite, Urine Negative  Negative   Protein, Urine Negative/Trace  Trace   Glucose, Urine Negative  Negative   Ketones, Urine Negative  Negative   Urobilinogen, Urine 0.2 - 1.0 mg/dL 0.2   Bilirubin, Urine Negative  Negative   Blood, Urine Negative  2+ !   Bacteria None seen/Few  None seen   Baso (Absolute) 0.0 - 0.2 x10E3/uL 0.1   Basos Not Estab. % 2   Casts None seen /lpf None seen   Eos Not Estab. % 7   Eos (Absolute) 0.0 - 0.4 x10E3/uL 0.2   Epithelial Cells (non renal) 0 - 10 /hpf 0-10   Hep C Virus Ab Non Reactive  Non Reactive   HIV Screen 4th Generation wRfx Non Reactive  Non Reactive   Immature Grans (Abs) 0.0 - 0.1 x10E3/uL 0.0   Immature Granulocytes Not Estab. % 0   Lymphs Not Estab. % 39   Lymphs (Absolute) 0.7 - 3.1 x10E3/uL 1.4   Microscopic Examination  See below:   Monocytes Not Estab. % 6   Monocytes(Absolute) 0.1 - 0.9 x10E3/uL 0.2   Neutrophils Not Estab. % 46   RBC 0 - 2 /hpf 3-10 !   Urinalysis Reflex  Comment   VLDL Cholesterol Raphael 5 - 40 mg/dL 10   WBC 0 - 5 /hpf 0-5   !: Data is abnormal    Suspect mild gastritis  Lower protein intake  Pepcid BID x 5-7 days  Report status next week  US abdomen if symptoms persist         Relevant Medications    famotidine (PEPCID) 20 mg tablet           HEVER Kerr  2/20/2025

## 2025-02-20 NOTE — ASSESSMENT & PLAN NOTE
Latest Reference Range & Units 02/10/25 09:16   Cholesterol 100 - 199 mg/dL 258 (H)   Triglycerides 0 - 149 mg/dL 65   HDL >39 mg/dL 73   LDL Calculated 0 - 99 mg/dL 175 (H)   (H): Data is abnormally high    Much higher than last reading  ? Lab error  Cut out daily eggs  Eat more fish, olive oil, oatmeal, walnuts/almonds, avocado  Repeat lipid panel 3 months

## 2025-03-12 ENCOUNTER — OFFICE VISIT (OUTPATIENT)
Dept: PRIMARY CARE | Facility: CLINIC | Age: 45
End: 2025-03-12
Payer: COMMERCIAL

## 2025-03-12 VITALS
BODY MASS INDEX: 20.66 KG/M2 | HEART RATE: 78 BPM | TEMPERATURE: 97.4 F | WEIGHT: 121 LBS | OXYGEN SATURATION: 98 % | DIASTOLIC BLOOD PRESSURE: 72 MMHG | SYSTOLIC BLOOD PRESSURE: 110 MMHG | HEIGHT: 64 IN

## 2025-03-12 DIAGNOSIS — R92.1 BREAST CALCIFICATIONS: ICD-10-CM

## 2025-03-12 DIAGNOSIS — Z01.419 ENCOUNTER FOR GYNECOLOGICAL EXAMINATION WITHOUT ABNORMAL FINDING: Primary | ICD-10-CM

## 2025-03-12 PROBLEM — R10.10 UPPER ABDOMINAL PAIN: Status: RESOLVED | Noted: 2025-02-20 | Resolved: 2025-03-12

## 2025-03-12 PROCEDURE — S0612 ANNUAL GYNECOLOGICAL EXAMINA: HCPCS | Performed by: NURSE PRACTITIONER

## 2025-03-12 ASSESSMENT — ENCOUNTER SYMPTOMS: CONSTITUTIONAL NEGATIVE: 1

## 2025-03-12 NOTE — PROGRESS NOTES
[facility] [place]  Jennifer TranovikipHEVER  [address]  [facility] [address]  [phone]  Fax [phone]      3/12/2025    Tyree Rob is a 45 y.o. female who presents for annual exam.   Periods are regular every 28-30 days, lasting 5 days. Dysmenorrhea:mild, occurring first 1-2 days of flow. Cyclic symptoms include headache. No intermenstrual bleeding, spotting, or discharge.    The patient is sexually active. GYN screening history: last pap: was normal. Domestic violence: no.     Current contraception: vasectomy  History of abnormal Pap smear: no  Family history of uterine or ovarian cancer: no  Regular self breast exam: yes  History of abnormal mammogram: yes - calcification in breast  Family history of breast cancer: no  History of abnormal lipids: no    Menstrual History:  OB History          3    Para   3    Term                AB        Living             SAB        IAB        Ectopic        Multiple        Live Births                    Patient's last menstrual period was 2025.         Past Medical History:   Diagnosis Date    H/O cold sores        Past Surgical History   Procedure Laterality Date     section      x2    Correction hammer toe Right     Ganglion cyst excision Left        Family History   Problem Relation Name Age of Onset    Hyperlipidemia Biological Mother      Osteoporosis Biological Mother      Hyperlipidemia Biological Father      Hypertension Biological Father         Social History     Tobacco Use    Smoking status: Never    Smokeless tobacco: Never   Substance Use Topics    Alcohol use: Never    Drug use: Never       The following have been reviewed and updated as appropriate in this visit:         HPI  Routine GYN exam  No hormones  Regular periods  Denies any breast, pelvic or vaginal symptoms        Review Of Systems  Review of Systems   Constitutional: Negative.    Genitourinary: Negative.    Breast:  "Negative.       Visit Vitals  /72 (BP Location: Left upper arm, Patient Position: Sitting)   Pulse 78   Temp 36.3 °C (97.4 °F) (Temporal)   Ht 1.626 m (5' 4\")   Wt 54.9 kg (121 lb)   LMP 02/07/2025   SpO2 98%   BMI 20.77 kg/m²       OB/GYN Physical Exam  Physical Exam  Constitutional:       General: She is not in acute distress.     Appearance: Normal appearance. She is not ill-appearing, toxic-appearing or diaphoretic.     Genitourinary:    Vagina, cervix, uterus, urethral meatus, external female genitalia and adnexa normal.     Cardiovascular:      Rate and Rhythm: Normal rate and regular rhythm.   Pulmonary:      Effort: Pulmonary effort is normal. No respiratory distress.   Chest:   Breasts:     Right: Normal.      Left: Normal.   Abdominal:      General: Bowel sounds are normal. There is no distension.      Palpations: Abdomen is soft. There is no mass.      Tenderness: There is no abdominal tenderness. There is no right CVA tenderness or left CVA tenderness.   Lymphadenopathy:      Upper Body:      Right upper body: No axillary adenopathy.      Left upper body: No axillary adenopathy.   Neurological:      Mental Status: She is alert and oriented to person, place, and time.   Vitals reviewed.           Problem List Items Addressed This Visit       Breast calcifications    Current Assessment & Plan     ative & Impression   CLINICAL HISTORY: Left breast microcalcifications. Previous benign biopsy.  The  calculated lifetime risk of breast cancer based on the Ashley model is 7.9%     COMMENT: Bilateral full field digital mammogram with tomosynthesis was  performed. Additional magnification and 90 degree lateral views were obtained of  the left breast. The breasts are extremely dense, which lowers the sensitivity  of mammography. This study is compared to prior examinations dating back to  2021.     There is a left breast clip. There are numerous adjacent microcalcifications  which are increased compared to the " previous examination. These are rounded and  amorphous on the cc view. Many of these layer on the 90 degree lateral view and  many additional calcifications are seen in the anterior breast on the 90 degree  lateral view. This appearance is most consistent with milk of calcium.  Correlation with prior biopsy results is recommended. Six-month follow-up  mammogram of the left breast is recommended. There is no evidence of a  suspicious mass or architectural distortion.     Computer assisted detection was utilized during the interpretation of this  examination.     --  IMPRESSION:  1.  Extremely dense breasts which can conceal underlying abnormalities.  2.  Increasing probably benign left breast microcalcifications. Six-month  mammogram follow-up of the left breast is recommended.     Written results were conveyed to the patient.     FINAL ASSESSMENT CATEGORY 3 - PROBABLY BENIGN (6MOS LT)  DENSE  The patient's information has been entered into our automated reminder system  with a target due date for her next mammogram.     Mammography reports should be evaluated with the following in mind:  1. A report that is negative for malignancy should not delay biopsy, if there is  a dominant or clinically suspicious mass.  Some cancers are not visualized by  mammography.  2. In dense breasts, an underlying mass may be obscured.          Repeat L breast diagnostic mammo in 6 mo( August)  Ordered.         Relevant Orders    BI DIAGNOSTIC MAMMOGRAM LEFT (TOMOSYNTHESIS)    Encounter for gynecological examination without abnormal finding - Primary    Current Assessment & Plan     PAP sent  Mammogram UTD  Continue self breast exams monthly.  Will look into FIT kit--submitted but never resulted.         Relevant Orders    Cytology, Thinprep Pap             HEVER Kerr  3/12/2025

## 2025-03-12 NOTE — ASSESSMENT & PLAN NOTE
PAP sent  Mammogram UTD  Continue self breast exams monthly.  Will look into FIT kit--submitted but never resulted.

## 2025-03-12 NOTE — ASSESSMENT & PLAN NOTE
ative & Impression   CLINICAL HISTORY: Left breast microcalcifications. Previous benign biopsy.  The  calculated lifetime risk of breast cancer based on the Ashley model is 7.9%     COMMENT: Bilateral full field digital mammogram with tomosynthesis was  performed. Additional magnification and 90 degree lateral views were obtained of  the left breast. The breasts are extremely dense, which lowers the sensitivity  of mammography. This study is compared to prior examinations dating back to  2021.     There is a left breast clip. There are numerous adjacent microcalcifications  which are increased compared to the previous examination. These are rounded and  amorphous on the cc view. Many of these layer on the 90 degree lateral view and  many additional calcifications are seen in the anterior breast on the 90 degree  lateral view. This appearance is most consistent with milk of calcium.  Correlation with prior biopsy results is recommended. Six-month follow-up  mammogram of the left breast is recommended. There is no evidence of a  suspicious mass or architectural distortion.     Computer assisted detection was utilized during the interpretation of this  examination.     --  IMPRESSION:  1.  Extremely dense breasts which can conceal underlying abnormalities.  2.  Increasing probably benign left breast microcalcifications. Six-month  mammogram follow-up of the left breast is recommended.     Written results were conveyed to the patient.     FINAL ASSESSMENT CATEGORY 3 - PROBABLY BENIGN (6MOS LT)  DENSE  The patient's information has been entered into our automated reminder system  with a target due date for her next mammogram.     Mammography reports should be evaluated with the following in mind:  1. A report that is negative for malignancy should not delay biopsy, if there is  a dominant or clinically suspicious mass.  Some cancers are not visualized by  mammography.  2. In dense breasts, an underlying mass may be  obscured.          Repeat L breast diagnostic mammo in 6 mo( August)  Ordered.

## 2025-03-14 LAB
CYTOLOGIST CVX/VAG CYTO: NORMAL
CYTOLOGY CVX/VAG DOC CYTO: NORMAL
CYTOLOGY CVX/VAG DOC THIN PREP: NORMAL
DX ICD CODE: NORMAL
LAB CORP .: NORMAL
OTHER STN SPEC: NORMAL
SERVICE CMNT-IMP: NORMAL
STAT OF ADQ CVX/VAG CYTO-IMP: NORMAL

## 2025-03-20 DIAGNOSIS — R10.10 UPPER ABDOMINAL PAIN: ICD-10-CM

## 2025-03-20 RX ORDER — FAMOTIDINE 20 MG/1
20 TABLET, FILM COATED ORAL 2 TIMES DAILY
Qty: 60 TABLET | Refills: 2 | Status: SHIPPED | OUTPATIENT
Start: 2025-03-20 | End: 2025-04-19

## 2025-03-20 NOTE — TELEPHONE ENCOUNTER
"Medicine Refill Request    Last Office Visit: 3/12/2025   Last Consult Visit: Visit date not found  Last Telemedicine Visit: Visit date not found    Next Appointment: Visit date not found      Current Outpatient Medications:     betamethasone dipropionate 0.05 % cream, Apply topically 2 (two) times a day for 10 days., Disp: 45 g, Rfl: 0    famotidine (PEPCID) 20 mg tablet, Take 1 tablet (20 mg total) by mouth 2 (two) times a day., Disp: 60 tablet, Rfl: 0    valACYclovir (VALTREX) 1 gram tablet, Take 1 tablet (1,000 mg total) by mouth 2 (two) times a day as needed (cold sores)., Disp: 30 tablet, Rfl: 2    BP Readings from Last 3 Encounters:   03/12/25 110/72   02/20/25 92/62   01/30/25 98/64       Recent Lab results:  Lab Results   Component Value Date    CHOL 258 (H) 02/10/2025   ,   Lab Results   Component Value Date    HDL 73 02/10/2025   ,   Lab Results   Component Value Date    LDLCALC 175 (H) 02/10/2025   ,   Lab Results   Component Value Date    TRIG 65 02/10/2025        Lab Results   Component Value Date    GLUCOSE 97 02/10/2025   , No results found for: \"HGBA1C\"      Lab Results   Component Value Date    CREATININE 0.90 02/10/2025       Lab Results   Component Value Date    TSH 2.070 02/10/2025         No results found for: \"HGBA1C\"  "

## 2025-04-15 LAB — HEMOCCULT STL QL IA: NEGATIVE

## 2025-05-30 LAB
CHOLEST SERPL-MCNC: 205 MG/DL (ref 100–199)
HDLC SERPL-MCNC: 75 MG/DL
LDLC SERPL CALC-MCNC: 119 MG/DL (ref 0–99)
TRIGL SERPL-MCNC: 61 MG/DL (ref 0–149)
VLDLC SERPL CALC-MCNC: 11 MG/DL (ref 5–40)

## 2025-06-02 ENCOUNTER — RESULTS FOLLOW-UP (OUTPATIENT)
Dept: PRIMARY CARE | Facility: CLINIC | Age: 45
End: 2025-06-02

## 2025-06-24 ENCOUNTER — OFFICE VISIT (OUTPATIENT)
Dept: PRIMARY CARE | Facility: CLINIC | Age: 45
End: 2025-06-24
Payer: COMMERCIAL

## 2025-06-24 VITALS
WEIGHT: 120 LBS | DIASTOLIC BLOOD PRESSURE: 70 MMHG | HEIGHT: 64 IN | OXYGEN SATURATION: 98 % | SYSTOLIC BLOOD PRESSURE: 110 MMHG | BODY MASS INDEX: 20.49 KG/M2 | TEMPERATURE: 97.5 F | HEART RATE: 83 BPM

## 2025-06-24 DIAGNOSIS — L25.5 CONTACT DERMATITIS DUE TO PLANT: Primary | ICD-10-CM

## 2025-06-24 PROCEDURE — 99213 OFFICE O/P EST LOW 20 MIN: CPT | Performed by: NURSE PRACTITIONER

## 2025-06-24 PROCEDURE — 3008F BODY MASS INDEX DOCD: CPT | Performed by: NURSE PRACTITIONER

## 2025-06-24 RX ORDER — PREDNISONE 10 MG/1
TABLET ORAL
Qty: 21 TABLET | Refills: 0 | Status: SHIPPED | OUTPATIENT
Start: 2025-06-24

## 2025-06-24 ASSESSMENT — PAIN SCALES - GENERAL: PAINLEVEL_OUTOF10: 0-NO PAIN

## 2025-06-24 ASSESSMENT — ENCOUNTER SYMPTOMS
FEVER: 0
SHORTNESS OF BREATH: 0
FATIGUE: 0

## 2025-06-24 NOTE — ASSESSMENT & PLAN NOTE
Steroid taper  Antihistamines as needed for itching  Tepid showers  Follow up if symptoms worsen/persist.

## 2025-06-24 NOTE — PROGRESS NOTES
[facility] at [address]  [place] [address]  [phone]  Fax [phone]      Patient ID: Tyree Rob                              : 1980    Visit Date: 2025    Chief Complaint: Rash         Patient ID: Tyree Rob is a 45 y.o. female.    Patient Active Problem List   Diagnosis    Breast calcifications    Hypercholesterolemia    Other headache syndrome    Cold sore    Encounter for gynecological examination without abnormal finding    Routine physical examination    Contact dermatitis due to plant         Current Outpatient Medications:     predniSONE (DELTASONE) 10 mg tablet, 2 po BID x 3 then 1 po BID x 3 then 1 po QD x 3. Take with food., Disp: 21 tablet, Rfl: 0    betamethasone dipropionate 0.05 % cream, Apply topically 2 (two) times a day for 10 days., Disp: 45 g, Rfl: 0    famotidine (PEPCID) 20 mg tablet, Take 1 tablet (20 mg total) by mouth 2 (two) times a day., Disp: 60 tablet, Rfl: 2    valACYclovir (VALTREX) 1 gram tablet, Take 1 tablet (1,000 mg total) by mouth 2 (two) times a day as needed (cold sores)., Disp: 30 tablet, Rfl: 2    No Known Allergies    Social History     Tobacco Use    Smoking status: Never    Smokeless tobacco: Never   Substance Use Topics    Alcohol use: Never    Drug use: Never       Health Maintenance   Topic Date Due    DTaP, Tdap, and Td Vaccines (2 - Td or Tdap) 2026 (Originally 4/15/2024)    COVID-19 Vaccine ( -  season) 2026 (Originally 2024)    Depression Screening  10/15/2025    Breast Cancer Screening  2026    Colorectal Cancer Screening  2026    Zoster Vaccine (1 of 2) 2028    Cervical Cancer Screening  2030    RSV Vaccine (1 - 1-dose 75+ series) 2053    Influenza Vaccine  Completed    HIV Screening  Completed    Hepatitis C Screening  Completed    Meningococcal ACWY  Aged Out    RSV <20 months  Aged Out    HIB Vaccines  Aged  "Out    IPV Vaccines  Aged Out    Meningococcal B  Aged Out    HPV Vaccines  Aged Out    Pneumococcal  Aged Out    Hepatitis B Vaccines  Discontinued       HPI  Possible poison ivy?    Rash  This is a new problem. The current episode started in the past 7 days. The problem has been gradually worsening since onset. The affected locations include the neck, left arm, right arm and left hand. The rash is characterized by blistering, redness, itchiness and swelling. She was exposed to plant contact. Pertinent negatives include no fatigue, fever, joint pain or shortness of breath. Past treatments include nothing.       The following have been reviewed and updated as appropriate in this visit:          Review of System  Review of Systems   Constitutional:  Negative for fatigue and fever.   Respiratory:  Negative for shortness of breath.    Musculoskeletal:  Negative for joint pain.   Skin:  Positive for rash.       Objective     Vitals  Vitals:    06/24/25 1334   BP: 110/70   BP Location: Left upper arm   Patient Position: Sitting   Pulse: 83   Temp: 36.4 °C (97.5 °F)   TempSrc: Temporal   SpO2: 98%   Weight: 54.4 kg (120 lb)   Height: 1.626 m (5' 4.02\")     Body mass index is 20.59 kg/m².      Physical Exam  Vitals reviewed.   Constitutional:       General: She is not in acute distress.     Appearance: Normal appearance. She is not ill-appearing, toxic-appearing or diaphoretic.   Cardiovascular:      Rate and Rhythm: Normal rate and regular rhythm.   Pulmonary:      Effort: Pulmonary effort is normal. No respiratory distress.   Skin:     Findings: Rash present.      Comments: Vesiculopapular erythematous lesions noted on L hand,  both arms and neck.  C/w a plant dermatitis  Linear presentation  Non tender  No discharge   Neurological:      Mental Status: She is alert and oriented to person, place, and time.         Assessment/Plan     Problem List Items Addressed This Visit       Contact dermatitis due to plant - Primary "    Steroid taper  Antihistamines as needed for itching  Tepid showers  Follow up if symptoms worsen/persist.           Relevant Medications    predniSONE (DELTASONE) 10 mg tablet           HEVER Kerr  6/24/2025

## 2025-07-05 ENCOUNTER — HOSPITAL ENCOUNTER (OUTPATIENT)
Facility: CLINIC | Age: 45
Discharge: HOME | End: 2025-07-05
Attending: EMERGENCY MEDICINE
Payer: COMMERCIAL

## 2025-07-05 VITALS — OXYGEN SATURATION: 98 % | DIASTOLIC BLOOD PRESSURE: 88 MMHG | HEART RATE: 77 BPM | SYSTOLIC BLOOD PRESSURE: 127 MMHG

## 2025-07-05 DIAGNOSIS — L23.7 ALLERGIC CONTACT DERMATITIS DUE TO PLANTS, EXCEPT FOOD: Primary | ICD-10-CM

## 2025-07-05 PROCEDURE — 99213 OFFICE O/P EST LOW 20 MIN: CPT | Performed by: EMERGENCY MEDICINE

## 2025-07-05 PROCEDURE — S9083 URGENT CARE CENTER GLOBAL: HCPCS | Performed by: EMERGENCY MEDICINE

## 2025-07-05 RX ORDER — METHYLPREDNISOLONE 4 MG/1
TABLET ORAL
Qty: 1 TABLET | Refills: 0 | Status: SHIPPED | OUTPATIENT
Start: 2025-07-05

## 2025-07-05 NOTE — ED PROVIDER NOTES
History  Chief Complaint   Patient presents with    Rash     Pt states she has a full body rash - rash appeared 2 weeks ago start taking prednisone rash has not subsided      Short taper prednisone helps, then recurred      History provided by:  Patient   used: No    Rash  Location: arms, abdomen, back, legs.  Quality: itchiness, redness and swelling    Severity:  Moderate  Onset quality:  Gradual  Duration:  1 week  Timing:  Intermittent  Progression:  Worsening  Chronicity:  New  Context: plant contact    Relieved by:  Nothing  Worsened by:  Nothing  Associated symptoms: no fatigue, no periorbital edema, no shortness of breath and no sore throat        Past Medical History:   Diagnosis Date    H/O cold sores        Past Surgical History   Procedure Laterality Date     section      x2    Correction hammer toe Right     Ganglion cyst excision Left        Family History   Problem Relation Name Age of Onset    Hyperlipidemia Biological Mother      Osteoporosis Biological Mother      Hyperlipidemia Biological Father      Hypertension Biological Father         Social History     Tobacco Use    Smoking status: Never    Smokeless tobacco: Never   Substance Use Topics    Alcohol use: Never    Drug use: Never       Review of Systems   Constitutional:  Negative for fatigue.   HENT:  Negative for sore throat.    Respiratory:  Negative for shortness of breath.    Skin:  Positive for rash.       Physical Exam  ED Triage Vitals [25 1041]   Temp Heart Rate Resp BP SpO2   -- 77 -- 127/88 98 %      Temp Source Heart Rate Source Patient Position BP Location FiO2 (%) (Set)   Oral -- -- Right upper arm --       Physical Exam  Constitutional:       Appearance: She is normal weight.   Musculoskeletal:         General: Normal range of motion.   Skin:     General: Skin is warm.      Findings: Rash present.      Comments: Raised papules to arms, legs, abdomen/back   Neurological:      General: No focal  deficit present.      Mental Status: She is alert. Mental status is at baseline.           Procedures  Procedures    UC Course       Medical Decision Making  Longer medrol dosepack  Start [id number] everyday in the am (anti-histamine)  Start Benadryl 50mg everyday at night (anti-histamine)  Start pepcid 20mg twice a day x 1 week (anti-histamine)  Take the steroids as directed.                      Halle Marie,   07/05/25 1056

## 2025-07-05 NOTE — DISCHARGE INSTRUCTIONS
Start [id number] everyday in the am (anti-histamine)  Start Benadryl 50mg everyday at night (anti-histamine)  Start pepcid 20mg twice a day x 1 week (anti-histamine)  Take the steroids as directed.